# Patient Record
Sex: FEMALE | Race: WHITE | NOT HISPANIC OR LATINO | Employment: PART TIME | URBAN - METROPOLITAN AREA
[De-identification: names, ages, dates, MRNs, and addresses within clinical notes are randomized per-mention and may not be internally consistent; named-entity substitution may affect disease eponyms.]

---

## 2017-06-14 ENCOUNTER — OFFICE VISIT (OUTPATIENT)
Dept: MEDICAL GROUP | Facility: MEDICAL CENTER | Age: 32
End: 2017-06-14
Attending: NURSE PRACTITIONER
Payer: MEDICAID

## 2017-06-14 ENCOUNTER — HOSPITAL ENCOUNTER (OUTPATIENT)
Facility: MEDICAL CENTER | Age: 32
End: 2017-06-14
Attending: NURSE PRACTITIONER
Payer: MEDICAID

## 2017-06-14 VITALS
OXYGEN SATURATION: 97 % | SYSTOLIC BLOOD PRESSURE: 136 MMHG | RESPIRATION RATE: 16 BRPM | WEIGHT: 188 LBS | HEART RATE: 84 BPM | DIASTOLIC BLOOD PRESSURE: 88 MMHG | TEMPERATURE: 99 F | BODY MASS INDEX: 33.31 KG/M2 | HEIGHT: 63 IN

## 2017-06-14 DIAGNOSIS — Z11.3 ROUTINE SCREENING FOR STI (SEXUALLY TRANSMITTED INFECTION): ICD-10-CM

## 2017-06-14 DIAGNOSIS — Z30.41 ENCOUNTER FOR SURVEILLANCE OF CONTRACEPTIVE PILLS: ICD-10-CM

## 2017-06-14 DIAGNOSIS — Z01.419 ROUTINE GYNECOLOGICAL EXAMINATION: ICD-10-CM

## 2017-06-14 DIAGNOSIS — E66.9 OBESITY (BMI 30-39.9): ICD-10-CM

## 2017-06-14 DIAGNOSIS — Z12.4 SCREENING FOR CERVICAL CANCER: ICD-10-CM

## 2017-06-14 DIAGNOSIS — Z11.59 SCREENING FOR VIRAL DISEASE: ICD-10-CM

## 2017-06-14 DIAGNOSIS — Z13.1 SCREENING FOR DIABETES MELLITUS (DM): ICD-10-CM

## 2017-06-14 DIAGNOSIS — Z13.220 NEED FOR LIPID SCREENING: ICD-10-CM

## 2017-06-14 PROCEDURE — 88175 CYTOPATH C/V AUTO FLUID REDO: CPT

## 2017-06-14 PROCEDURE — G0101 CA SCREEN;PELVIC/BREAST EXAM: HCPCS | Performed by: NURSE PRACTITIONER

## 2017-06-14 PROCEDURE — 87624 HPV HI-RISK TYP POOLED RSLT: CPT

## 2017-06-14 PROCEDURE — 99212 OFFICE O/P EST SF 10 MIN: CPT | Mod: 25 | Performed by: NURSE PRACTITIONER

## 2017-06-14 PROCEDURE — 87491 CHLMYD TRACH DNA AMP PROBE: CPT

## 2017-06-14 PROCEDURE — 87591 N.GONORRHOEAE DNA AMP PROB: CPT

## 2017-06-14 RX ORDER — NORGESTIMATE AND ETHINYL ESTRADIOL 7DAYSX3 28
1 KIT ORAL DAILY
Qty: 28 TAB | Refills: 11 | Status: SHIPPED | OUTPATIENT
Start: 2017-06-14 | End: 2018-05-11 | Stop reason: SDUPTHER

## 2017-06-14 RX ORDER — NORGESTIMATE AND ETHINYL ESTRADIOL 7DAYSX3 28
KIT ORAL
COMMUNITY
Start: 2017-05-20 | End: 2017-06-14 | Stop reason: SDUPTHER

## 2017-06-14 ASSESSMENT — PATIENT HEALTH QUESTIONNAIRE - PHQ9: CLINICAL INTERPRETATION OF PHQ2 SCORE: 0

## 2017-06-14 ASSESSMENT — PAIN SCALES - GENERAL: PAINLEVEL: NO PAIN

## 2017-06-14 NOTE — PROGRESS NOTES
SUBJECTIVE: 31 y.o. female for annual routine gynecologic exam  Chief Complaint   Patient presents with   • Gynecologic Exam       Pt in clinic today for a well woman's and annual exam. She has not been seen in the clinic since . She states she is going through a divorce but otherwise is well. Pt stating that she has been feeling well. No vaginal DC/odor, urinary symptoms. Denies bloating, pelvic pain. Pt is currently sexually active. She has had a new partner and would like STD testing. Last pap smear: 2014, normal.  She states her periods are regular and occur every 28 days. Last menstrual period: 17. She denies h/o abnormal pap smear. No h/o sexually transmitted infections. Current birth control/contraception method: OCPs-she states she needs a refill. Last mammogram: N/A. Pt does perform monthly self breast exams. +family history of breast cancer in both grandmothers, and maternal aunt in 40s. No breast tenderness, mass, nipple discharge, changes in size or contour. Last DEXA: N/A. Colon ca screening: N/A.       Obstetric History       T2      TAB1   SAB0   E0   M0   L2         She  reports that she has never smoked. She has never used smokeless tobacco.    LMP Date: 17      ROS:    No abdominal pain, change in bowel habits, black or bloody stools.    No unusual headaches, no visual changes  No prolonged cough. No dyspnea or chest pain on exertion.  No depression, labile mood, anxiety ,libido changes, insomnia.  No new/concerning skin lesions    Further ROS as documented above in my HPI    Preventive Care:      Current medications, allergies, and problem list reviewed with patient and updated in [facility].    Family History:   Family History   Problem Relation Age of Onset   • Hypertension Mother    • Hypertension Father    • Hypertension Maternal Grandmother    • Cancer Maternal Grandmother      breast   • Cancer Maternal Grandfather      prostate   • Cancer Paternal Grandmother       "breast   • Heart Disease Paternal Grandfather           OBJECTIVE:   /88 mmHg  Pulse 84  Temp(Src) 37.2 °C (99 °F)  Resp 16  Ht 1.6 m (5' 2.99\")  Wt 85.276 kg (188 lb)  BMI 33.31 kg/m2  SpO2 97%  LMP 05/16/2017  Breastfeeding? No  Body mass index is 33.31 kg/(m^2).    Head and neck:  Neck supple. No adenopathy or masses in the neck or supraclavicular regions.  No carotid bruits. No thyromegaly.   Neuro: Alert and oriented.  Chest:  Clear, good air entry, no wheezes or rales.   Heart:  Regular rate and rhythm.  S1 and S2 normal.  No edema or JVD.   Abdomen:  Soft without tenderness, guarding, mass or organomegaly.  No CVA tenderness or inguinal adenopathy.   Extremities:  No edema.  Skin: color normal, temperature normal   No rashes or suspicious skin lesions noted.  Breast Exam: Breast exam completed. Symmetrical. No dimpling. No nodules palpated.  No axillary/supraclavicular lymphadenopathy  Pelvic Exam -  Normal external genitalia with no lesions. Normal vaginal mucosa with normal rugation and scant discharge. Cervix with no visible lesions. No cervical motion tenderness. Uterus is normal sized with no masses. No adnexal tenderness or enlargement appreciated. specimen obtained and sent to lab      Assessment and Plan    1. Routine gynecological examination     2. Encounter for surveillance of contraceptive pills  TRI-SPRINTEC 0.18/0.215/0.25 MG-35 MCG Tab    She was educated that OCPs do not protect against STIs and she needs to continue to use condoms with sexual activity.  She was told that some medications can alter the effectiveness of OCPs and she is to discuss this with a pharmacist if she is prescribed a medication/antibiotic.  She was educated that abnormal bleeding or spotting is common in the first couple of months on birth control.  She was educated in risk and s/s of thromboembolism and understands that she is to seek emergency care if these occur.     3. Screening for cervical cancer  " THINPREP PAP W/HPV AND CTNG   4. Routine screening for STI (sexually transmitted infection)  GC testing added to Pap smear.   5. Screening for viral disease  HIV ANTIBODIES    HEPATITIS PANEL ACUTE(4 COMPONENTS)   6. Screening for diabetes mellitus (DM)  COMP METABOLIC PANEL   7. Need for lipid screening  LIPID PROFILE   8. Obesity (BMI 30-39.9)  CBC WITHOUT DIFFERENTIAL    COMP METABOLIC PANEL    LIPID PROFILE    Patient identified as having weight management issue.  Appropriate orders and counseling given.       Follow-up: routine FU as scheduled.     Will call with results of labs.  Current screening recommendations reviewed with patient

## 2017-06-14 NOTE — MR AVS SNAPSHOT
"        Valerie Ruggiero   2017 3:50 PM   Office Visit   MRN: [id number]    [facility]   Dept Phone:  [phone]    Description:  Female : 1985   Provider:  DENISE Godoy           Reason for Visit     Gynecologic Exam           Allergies as of 2017     Allergen Noted Reactions    Amoxicillin 2013   Nausea      You were diagnosed with     Routine gynecological examination   [V72.31.ICD-9-CM]       Encounter for surveillance of contraceptive pills   [468493]       Screening for cervical cancer   [425521]       Routine screening for STI (sexually transmitted infection)   [887254]       Screening for diabetes mellitus (DM)   [249700]       Need for lipid screening   [273092]       Screening for viral disease   [218299]       Obesity (BMI 30-39.9)   [953717]         Vital Signs     Blood Pressure Pulse Temperature Respirations Height Weight    136/88 mmHg 84 37.2 °C (99 °F) 16 1.6 m (5' 2.99\") 85.276 kg (188 lb)    Body Mass Index Oxygen Saturation Last Menstrual Period Breastfeeding? Smoking Status       33.31 kg/m2 97% 2017 No Never Smoker        Basic Information     Date Of Birth Sex Race Ethnicity Preferred Language    1985 Female White Non- English      Health Maintenance        Date Due Completion Dates    PAP SMEAR 2017, 2013, 2013    IMM DTaP/Tdap/Td Vaccine (2 - Td) 2023            Current Immunizations     MMR Vaccine 2013  5:15 AM    MMR/Varicella Combined Vaccine  Incomplete    Tdap Vaccine 2013  2:45 AM,  Incomplete      Below and/or attached are the medications your provider expects you to take. Review all of your home medications and newly ordered medications with your provider and/or pharmacist. Follow medication instructions as directed by your provider and/or pharmacist. Please keep your medication list with you and share with your provider. Update the information when " medications are discontinued, doses are changed, or new medications (including over-the-counter products) are added; and carry medication information at all times in the event of emergency situations     Allergies:  AMOXICILLIN - Nausea               Medications  Valid as of: June 14, 2017 -  4:31 PM    Generic Name Brand Name Tablet Size Instructions for use    Norgestim-Eth Estrad Triphasic (Tab) TRI-SPRINTEC 0.18/0.215/0.25 MG-35 MCG Take 1 Tab by mouth every day.        .                 Medicines prescribed today were sent to:     [facility] [address] [place] [address] - [address] [id number] [facility] [address]    Phone: [phone] Fax: [phone]    Open 24 Hours?: No      Medication refill instructions:       If your prescription bottle indicates you have medication refills left, it is not necessary to call your provider’s office. Please contact your pharmacy and they will refill your medication.    If your prescription bottle indicates you do not have any refills left, you may request refills at any time through one of the following ways: The online [employer] system (except Urgent Care), by calling your provider’s office, or by asking your pharmacy to contact your provider’s office with a refill request. Medication refills are processed only during regular business hours and may not be available until the next business day. Your provider may request additional information or to have a follow-up visit with you prior to refilling your medication.   *Please Note: Medication refills are assigned a new Rx number when refilled electronically. Your pharmacy may indicate that no refills were authorized even though a new prescription for the same medication is available at the pharmacy. Please request the medicine by name with the pharmacy before contacting your provider for a refill.        Your To Do List     Future Labs/Procedures Complete By Expires    CBC WITHOUT DIFFERENTIAL  As directed  12/14/2017    COMP METABOLIC PANEL  As directed 6/14/2018    HEPATITIS PANEL ACUTE(4 COMPONENTS)  As directed 6/14/2018    HIV ANTIBODIES  As directed 6/14/2018    LIPID PROFILE  As directed 6/14/2018    THINPREP PAP W/HPV AND CTNG  As directed 6/14/2018         [employer] Access Code: [email or web address]  Expires: 7/13/2017 11:46 AM    [employer]  A secure, online tool to manage your health information     [employer]’s [employer]® is a secure, online tool that connects you to your personalized health information from the privacy of your home -- day or night - making it very easy for you to manage your healthcare. Once the activation process is completed, you can even access your medical information using the [employer] jojo, which is available for free in the Apple Jojo store or Google Play store.     [employer] provides the following levels of access (as shown below):   My Chart Features   [facility] Primary Care Doctor [facility]  Specialists [facility]  Urgent  Care Non-[facility]  Primary Care  Doctor   Email your healthcare team securely and privately 24/7 X X X    Manage appointments: schedule your next appointment; view details of past/upcoming appointments X      Request prescription refills. X      View recent personal medical records, including lab and immunizations X X X X   View health record, including health history, allergies, medications X X X X   Read reports about your outpatient visits, procedures, consult and ER notes X X X X   See your discharge summary, which is a recap of your hospital and/or ER visit that includes your diagnosis, lab results, and care plan. X X       How to register for [employer]:  1. Go to  [email or web address].  2. Click on the Sign Up Now box, which takes you to the New Member Sign Up page. You will need to provide the following information:  a. Enter your [employer] Access Code exactly as it appears at the top of this page. (You will not need to use this code after you’ve completed the sign-up  process. If you do not sign up before the expiration date, you must request a new code.)   b. Enter your date of birth.   c. Enter your home email address.   d. Click Submit, and follow the next screen’s instructions.  3. Create a [employer] ID. This will be your [employer] login ID and cannot be changed, so think of one that is secure and easy to remember.  4. Create a [employer] password. You can change your password at any time.  5. Enter your Password Reset Question and Answer. This can be used at a later time if you forget your password.   6. Enter your e-mail address. This allows you to receive e-mail notifications when new information is available in [employer].  7. Click Sign Up. You can now view your health information.    For assistance activating your [employer] account, call [phone]

## 2017-06-15 LAB
C TRACH DNA GENITAL QL NAA+PROBE: NEGATIVE
CYTOLOGY REG CYTOL: NORMAL
HPV HR 12 DNA CVX QL NAA+PROBE: NEGATIVE
HPV16 DNA SPEC QL NAA+PROBE: NEGATIVE
HPV18 DNA SPEC QL NAA+PROBE: NEGATIVE
N GONORRHOEA DNA GENITAL QL NAA+PROBE: NEGATIVE
SPECIMEN SOURCE: NORMAL
SPECIMEN SOURCE: NORMAL

## 2018-03-18 ENCOUNTER — RESOLUTE PROFESSIONAL BILLING HOSPITAL PROF FEE (OUTPATIENT)
Dept: HOSPITALIST | Facility: MEDICAL CENTER | Age: 33
End: 2018-03-18
Payer: MEDICAID

## 2018-03-18 ENCOUNTER — HOSPITAL ENCOUNTER (INPATIENT)
Facility: MEDICAL CENTER | Age: 33
LOS: 2 days | DRG: 854 | End: 2018-03-20
Attending: EMERGENCY MEDICINE | Admitting: HOSPITALIST
Payer: MEDICAID

## 2018-03-18 ENCOUNTER — APPOINTMENT (OUTPATIENT)
Dept: RADIOLOGY | Facility: MEDICAL CENTER | Age: 33
DRG: 854 | End: 2018-03-18
Attending: DENTIST
Payer: MEDICAID

## 2018-03-18 DIAGNOSIS — K04.7 DENTAL ABSCESS: ICD-10-CM

## 2018-03-18 LAB
BASOPHILS # BLD AUTO: 0.1 % (ref 0–1.8)
BASOPHILS # BLD: 0.02 K/UL (ref 0–0.12)
EOSINOPHIL # BLD AUTO: 0.06 K/UL (ref 0–0.51)
EOSINOPHIL NFR BLD: 0.4 % (ref 0–6.9)
ERYTHROCYTE [DISTWIDTH] IN BLOOD BY AUTOMATED COUNT: 38.2 FL (ref 35.9–50)
HCT VFR BLD AUTO: 42 % (ref 37–47)
HGB BLD-MCNC: 15 G/DL (ref 12–16)
IMM GRANULOCYTES # BLD AUTO: 0.09 K/UL (ref 0–0.11)
IMM GRANULOCYTES NFR BLD AUTO: 0.6 % (ref 0–0.9)
LACTATE BLD-SCNC: 1.4 MMOL/L (ref 0.5–2)
LYMPHOCYTES # BLD AUTO: 2.14 K/UL (ref 1–4.8)
LYMPHOCYTES NFR BLD: 13.5 % (ref 22–41)
MCH RBC QN AUTO: 30.1 PG (ref 27–33)
MCHC RBC AUTO-ENTMCNC: 35.7 G/DL (ref 33.6–35)
MCV RBC AUTO: 84.3 FL (ref 81.4–97.8)
MONOCYTES # BLD AUTO: 0.79 K/UL (ref 0–0.85)
MONOCYTES NFR BLD AUTO: 5 % (ref 0–13.4)
NEUTROPHILS # BLD AUTO: 12.8 K/UL (ref 2–7.15)
NEUTROPHILS NFR BLD: 80.4 % (ref 44–72)
NRBC # BLD AUTO: 0 K/UL
NRBC BLD-RTO: 0 /100 WBC
PLATELET # BLD AUTO: 219 K/UL (ref 164–446)
PMV BLD AUTO: 10.5 FL (ref 9–12.9)
RBC # BLD AUTO: 4.98 M/UL (ref 4.2–5.4)
WBC # BLD AUTO: 15.9 K/UL (ref 4.8–10.8)

## 2018-03-18 PROCEDURE — 85025 COMPLETE CBC W/AUTO DIFF WBC: CPT | Mod: EDC

## 2018-03-18 PROCEDURE — 99223 1ST HOSP IP/OBS HIGH 75: CPT | Performed by: HOSPITALIST

## 2018-03-18 PROCEDURE — 99285 EMERGENCY DEPT VISIT HI MDM: CPT | Mod: EDC

## 2018-03-18 PROCEDURE — 770006 HCHG ROOM/CARE - MED/SURG/GYN SEMI*: Mod: EDC

## 2018-03-18 PROCEDURE — 83605 ASSAY OF LACTIC ACID: CPT | Mod: EDC

## 2018-03-18 PROCEDURE — 70355 PANORAMIC X-RAY OF JAWS: CPT

## 2018-03-18 PROCEDURE — 700102 HCHG RX REV CODE 250 W/ 637 OVERRIDE(OP): Mod: EDC | Performed by: EMERGENCY MEDICINE

## 2018-03-18 PROCEDURE — 36415 COLL VENOUS BLD VENIPUNCTURE: CPT | Mod: EDC

## 2018-03-18 PROCEDURE — 80048 BASIC METABOLIC PNL TOTAL CA: CPT | Mod: EDC

## 2018-03-18 PROCEDURE — A9270 NON-COVERED ITEM OR SERVICE: HCPCS | Mod: EDC | Performed by: EMERGENCY MEDICINE

## 2018-03-18 RX ORDER — OXYCODONE HYDROCHLORIDE 5 MG/1
5 TABLET ORAL
Status: DISCONTINUED | OUTPATIENT
Start: 2018-03-18 | End: 2018-03-19

## 2018-03-18 RX ORDER — PROMETHAZINE HYDROCHLORIDE 25 MG/1
12.5-25 TABLET ORAL EVERY 4 HOURS PRN
Status: DISCONTINUED | OUTPATIENT
Start: 2018-03-18 | End: 2018-03-20 | Stop reason: HOSPADM

## 2018-03-18 RX ORDER — CLINDAMYCIN PHOSPHATE 600 MG/50ML
600 INJECTION, SOLUTION INTRAVENOUS EVERY 8 HOURS
Status: DISCONTINUED | OUTPATIENT
Start: 2018-03-19 | End: 2018-03-20

## 2018-03-18 RX ORDER — BISACODYL 10 MG
10 SUPPOSITORY, RECTAL RECTAL
Status: DISCONTINUED | OUTPATIENT
Start: 2018-03-18 | End: 2018-03-20 | Stop reason: HOSPADM

## 2018-03-18 RX ORDER — AMOXICILLIN 250 MG
2 CAPSULE ORAL 2 TIMES DAILY
Status: DISCONTINUED | OUTPATIENT
Start: 2018-03-19 | End: 2018-03-20 | Stop reason: HOSPADM

## 2018-03-18 RX ORDER — SODIUM CHLORIDE 9 MG/ML
INJECTION, SOLUTION INTRAVENOUS CONTINUOUS
Status: DISCONTINUED | OUTPATIENT
Start: 2018-03-19 | End: 2018-03-20 | Stop reason: HOSPADM

## 2018-03-18 RX ORDER — CLINDAMYCIN PHOSPHATE 600 MG/50ML
INJECTION, SOLUTION INTRAVENOUS
Status: COMPLETED
Start: 2018-03-18 | End: 2018-03-19

## 2018-03-18 RX ORDER — SODIUM CHLORIDE 9 MG/ML
30 INJECTION, SOLUTION INTRAVENOUS
Status: COMPLETED | OUTPATIENT
Start: 2018-03-18 | End: 2018-03-19

## 2018-03-18 RX ORDER — HYDROCODONE BITARTRATE AND ACETAMINOPHEN 5; 325 MG/1; MG/1
1 TABLET ORAL ONCE
Status: COMPLETED | OUTPATIENT
Start: 2018-03-18 | End: 2018-03-18

## 2018-03-18 RX ORDER — POLYETHYLENE GLYCOL 3350 17 G/17G
1 POWDER, FOR SOLUTION ORAL
Status: DISCONTINUED | OUTPATIENT
Start: 2018-03-18 | End: 2018-03-20 | Stop reason: HOSPADM

## 2018-03-18 RX ORDER — ONDANSETRON 2 MG/ML
4 INJECTION INTRAMUSCULAR; INTRAVENOUS EVERY 4 HOURS PRN
Status: DISCONTINUED | OUTPATIENT
Start: 2018-03-18 | End: 2018-03-20 | Stop reason: HOSPADM

## 2018-03-18 RX ORDER — CLINDAMYCIN PHOSPHATE 600 MG/50ML
600 INJECTION, SOLUTION INTRAVENOUS ONCE
Status: COMPLETED | OUTPATIENT
Start: 2018-03-18 | End: 2018-03-19

## 2018-03-18 RX ORDER — OXYCODONE HYDROCHLORIDE 5 MG/1
2.5 TABLET ORAL
Status: DISCONTINUED | OUTPATIENT
Start: 2018-03-18 | End: 2018-03-19

## 2018-03-18 RX ORDER — ACETAMINOPHEN 325 MG/1
650 TABLET ORAL EVERY 6 HOURS PRN
Status: DISCONTINUED | OUTPATIENT
Start: 2018-03-18 | End: 2018-03-20 | Stop reason: HOSPADM

## 2018-03-18 RX ORDER — LORAZEPAM 1 MG/1
0.5 TABLET ORAL EVERY 6 HOURS PRN
Status: DISCONTINUED | OUTPATIENT
Start: 2018-03-18 | End: 2018-03-20 | Stop reason: HOSPADM

## 2018-03-18 RX ORDER — PROMETHAZINE HYDROCHLORIDE 12.5 MG/1
12.5-25 SUPPOSITORY RECTAL EVERY 4 HOURS PRN
Status: DISCONTINUED | OUTPATIENT
Start: 2018-03-18 | End: 2018-03-20 | Stop reason: HOSPADM

## 2018-03-18 RX ORDER — SODIUM CHLORIDE 9 MG/ML
500 INJECTION, SOLUTION INTRAVENOUS
Status: COMPLETED | OUTPATIENT
Start: 2018-03-18 | End: 2018-03-19

## 2018-03-18 RX ORDER — HEPARIN SODIUM 5000 [USP'U]/ML
5000 INJECTION, SOLUTION INTRAVENOUS; SUBCUTANEOUS EVERY 8 HOURS
Status: DISCONTINUED | OUTPATIENT
Start: 2018-03-19 | End: 2018-03-20 | Stop reason: HOSPADM

## 2018-03-18 RX ORDER — LORAZEPAM 2 MG/ML
0.5 INJECTION INTRAMUSCULAR EVERY 6 HOURS PRN
Status: DISCONTINUED | OUTPATIENT
Start: 2018-03-18 | End: 2018-03-20 | Stop reason: HOSPADM

## 2018-03-18 RX ORDER — ONDANSETRON 4 MG/1
4 TABLET, ORALLY DISINTEGRATING ORAL EVERY 4 HOURS PRN
Status: DISCONTINUED | OUTPATIENT
Start: 2018-03-18 | End: 2018-03-20 | Stop reason: HOSPADM

## 2018-03-18 RX ORDER — MORPHINE SULFATE 4 MG/ML
2 INJECTION, SOLUTION INTRAMUSCULAR; INTRAVENOUS
Status: DISCONTINUED | OUTPATIENT
Start: 2018-03-18 | End: 2018-03-20 | Stop reason: HOSPADM

## 2018-03-18 RX ADMIN — HYDROCODONE BITARTRATE AND ACETAMINOPHEN 1 TABLET: 5; 325 TABLET ORAL at 23:07

## 2018-03-18 ASSESSMENT — PAIN SCALES - GENERAL: PAINLEVEL_OUTOF10: 7

## 2018-03-19 PROBLEM — K04.7 DENTAL ABSCESS: Status: ACTIVE | Noted: 2018-03-19

## 2018-03-19 PROBLEM — A41.9 SEPSIS (HCC): Status: ACTIVE | Noted: 2018-03-19

## 2018-03-19 PROBLEM — E87.6 HYPOKALEMIA: Status: ACTIVE | Noted: 2018-03-19

## 2018-03-19 LAB
ANION GAP SERPL CALC-SCNC: 11 MMOL/L (ref 0–11.9)
ANION GAP SERPL CALC-SCNC: 13 MMOL/L (ref 0–11.9)
BASOPHILS # BLD AUTO: 0.3 % (ref 0–1.8)
BASOPHILS # BLD: 0.03 K/UL (ref 0–0.12)
BUN SERPL-MCNC: 7 MG/DL (ref 8–22)
BUN SERPL-MCNC: 8 MG/DL (ref 8–22)
CALCIUM SERPL-MCNC: 8.3 MG/DL (ref 8.5–10.5)
CALCIUM SERPL-MCNC: 9.8 MG/DL (ref 8.5–10.5)
CHLORIDE SERPL-SCNC: 106 MMOL/L (ref 96–112)
CHLORIDE SERPL-SCNC: 107 MMOL/L (ref 96–112)
CO2 SERPL-SCNC: 17 MMOL/L (ref 20–33)
CO2 SERPL-SCNC: 18 MMOL/L (ref 20–33)
CREAT SERPL-MCNC: 0.68 MG/DL (ref 0.5–1.4)
CREAT SERPL-MCNC: 0.82 MG/DL (ref 0.5–1.4)
EOSINOPHIL # BLD AUTO: 0.05 K/UL (ref 0–0.51)
EOSINOPHIL NFR BLD: 0.4 % (ref 0–6.9)
ERYTHROCYTE [DISTWIDTH] IN BLOOD BY AUTOMATED COUNT: 41.3 FL (ref 35.9–50)
GLUCOSE SERPL-MCNC: 106 MG/DL (ref 65–99)
GLUCOSE SERPL-MCNC: 126 MG/DL (ref 65–99)
HCG UR QL: NEGATIVE
HCT VFR BLD AUTO: 38.4 % (ref 37–47)
HGB BLD-MCNC: 13 G/DL (ref 12–16)
IMM GRANULOCYTES # BLD AUTO: 0.05 K/UL (ref 0–0.11)
IMM GRANULOCYTES NFR BLD AUTO: 0.4 % (ref 0–0.9)
LYMPHOCYTES # BLD AUTO: 2.28 K/UL (ref 1–4.8)
LYMPHOCYTES NFR BLD: 20.3 % (ref 22–41)
MCH RBC QN AUTO: 30.2 PG (ref 27–33)
MCHC RBC AUTO-ENTMCNC: 33.9 G/DL (ref 33.6–35)
MCV RBC AUTO: 89.1 FL (ref 81.4–97.8)
MONOCYTES # BLD AUTO: 0.5 K/UL (ref 0–0.85)
MONOCYTES NFR BLD AUTO: 4.5 % (ref 0–13.4)
NEUTROPHILS # BLD AUTO: 8.32 K/UL (ref 2–7.15)
NEUTROPHILS NFR BLD: 74.1 % (ref 44–72)
NRBC # BLD AUTO: 0 K/UL
NRBC BLD-RTO: 0 /100 WBC
PLATELET # BLD AUTO: 198 K/UL (ref 164–446)
PMV BLD AUTO: 10.1 FL (ref 9–12.9)
POTASSIUM SERPL-SCNC: 3.3 MMOL/L (ref 3.6–5.5)
POTASSIUM SERPL-SCNC: 3.4 MMOL/L (ref 3.6–5.5)
RBC # BLD AUTO: 4.31 M/UL (ref 4.2–5.4)
SODIUM SERPL-SCNC: 136 MMOL/L (ref 135–145)
SODIUM SERPL-SCNC: 136 MMOL/L (ref 135–145)
SP GR UR REFRACTOMETRY: 1.01
WBC # BLD AUTO: 11.2 K/UL (ref 4.8–10.8)

## 2018-03-19 PROCEDURE — 0CDXXZ0 EXTRACTION OF LOWER TOOTH, SINGLE, EXTERNAL APPROACH: ICD-10-PCS | Performed by: DENTIST

## 2018-03-19 PROCEDURE — 99232 SBSQ HOSP IP/OBS MODERATE 35: CPT | Performed by: INTERNAL MEDICINE

## 2018-03-19 PROCEDURE — 700111 HCHG RX REV CODE 636 W/ 250 OVERRIDE (IP): Mod: EDC

## 2018-03-19 PROCEDURE — 0W930ZZ DRAINAGE OF ORAL CAVITY AND THROAT, OPEN APPROACH: ICD-10-PCS | Performed by: DENTIST

## 2018-03-19 PROCEDURE — 700111 HCHG RX REV CODE 636 W/ 250 OVERRIDE (IP): Mod: EDC | Performed by: HOSPITALIST

## 2018-03-19 PROCEDURE — 700105 HCHG RX REV CODE 258: Mod: EDC | Performed by: HOSPITALIST

## 2018-03-19 PROCEDURE — 700102 HCHG RX REV CODE 250 W/ 637 OVERRIDE(OP): Mod: EDC | Performed by: HOSPITALIST

## 2018-03-19 PROCEDURE — A9270 NON-COVERED ITEM OR SERVICE: HCPCS | Mod: EDC | Performed by: HOSPITALIST

## 2018-03-19 PROCEDURE — 80048 BASIC METABOLIC PNL TOTAL CA: CPT | Mod: EDC

## 2018-03-19 PROCEDURE — 700101 HCHG RX REV CODE 250: Mod: EDC

## 2018-03-19 PROCEDURE — 85025 COMPLETE CBC W/AUTO DIFF WBC: CPT | Mod: EDC

## 2018-03-19 PROCEDURE — 700101 HCHG RX REV CODE 250: Mod: EDC | Performed by: HOSPITALIST

## 2018-03-19 PROCEDURE — 700111 HCHG RX REV CODE 636 W/ 250 OVERRIDE (IP): Mod: EDC | Performed by: INTERNAL MEDICINE

## 2018-03-19 PROCEDURE — 36415 COLL VENOUS BLD VENIPUNCTURE: CPT | Mod: EDC

## 2018-03-19 PROCEDURE — 160036 HCHG PACU - EA ADDL 30 MINS PHASE I: Mod: EDC | Performed by: DENTIST

## 2018-03-19 PROCEDURE — A9270 NON-COVERED ITEM OR SERVICE: HCPCS | Mod: EDC

## 2018-03-19 PROCEDURE — 160048 HCHG OR STATISTICAL LEVEL 1-5: Mod: EDC | Performed by: DENTIST

## 2018-03-19 PROCEDURE — 160002 HCHG RECOVERY MINUTES (STAT): Mod: EDC | Performed by: DENTIST

## 2018-03-19 PROCEDURE — 770006 HCHG ROOM/CARE - MED/SURG/GYN SEMI*: Mod: EDC

## 2018-03-19 PROCEDURE — 160027 HCHG SURGERY MINUTES - 1ST 30 MINS LEVEL 2: Mod: EDC | Performed by: DENTIST

## 2018-03-19 PROCEDURE — 81025 URINE PREGNANCY TEST: CPT | Mod: EDC

## 2018-03-19 PROCEDURE — 96365 THER/PROPH/DIAG IV INF INIT: CPT | Mod: EDC

## 2018-03-19 PROCEDURE — A9270 NON-COVERED ITEM OR SERVICE: HCPCS | Mod: EDC | Performed by: DENTIST

## 2018-03-19 PROCEDURE — 700102 HCHG RX REV CODE 250 W/ 637 OVERRIDE(OP): Mod: EDC | Performed by: DENTIST

## 2018-03-19 PROCEDURE — 160009 HCHG ANES TIME/MIN: Mod: EDC | Performed by: DENTIST

## 2018-03-19 PROCEDURE — 501838 HCHG SUTURE GENERAL: Mod: EDC | Performed by: DENTIST

## 2018-03-19 PROCEDURE — 700102 HCHG RX REV CODE 250 W/ 637 OVERRIDE(OP): Mod: EDC

## 2018-03-19 PROCEDURE — 87040 BLOOD CULTURE FOR BACTERIA: CPT | Mod: 91,EDC

## 2018-03-19 PROCEDURE — 160035 HCHG PACU - 1ST 60 MINS PHASE I: Mod: EDC | Performed by: DENTIST

## 2018-03-19 RX ORDER — HYDRALAZINE HYDROCHLORIDE 20 MG/ML
INJECTION INTRAMUSCULAR; INTRAVENOUS
Status: COMPLETED
Start: 2018-03-19 | End: 2018-03-19

## 2018-03-19 RX ORDER — DIPHENHYDRAMINE HYDROCHLORIDE 50 MG/ML
INJECTION INTRAMUSCULAR; INTRAVENOUS
Status: COMPLETED
Start: 2018-03-19 | End: 2018-03-19

## 2018-03-19 RX ORDER — MAGNESIUM HYDROXIDE 1200 MG/15ML
LIQUID ORAL
Status: DISCONTINUED | OUTPATIENT
Start: 2018-03-19 | End: 2018-03-19 | Stop reason: HOSPADM

## 2018-03-19 RX ORDER — HYDRALAZINE HYDROCHLORIDE 20 MG/ML
10 INJECTION INTRAMUSCULAR; INTRAVENOUS EVERY 6 HOURS PRN
Status: DISCONTINUED | OUTPATIENT
Start: 2018-03-19 | End: 2018-03-20 | Stop reason: HOSPADM

## 2018-03-19 RX ORDER — CHLORHEXIDINE GLUCONATE ORAL RINSE 1.2 MG/ML
15 SOLUTION DENTAL 2 TIMES DAILY
Status: DISCONTINUED | OUTPATIENT
Start: 2018-03-19 | End: 2018-03-20 | Stop reason: HOSPADM

## 2018-03-19 RX ORDER — LABETALOL HYDROCHLORIDE 5 MG/ML
INJECTION, SOLUTION INTRAVENOUS
Status: COMPLETED
Start: 2018-03-19 | End: 2018-03-19

## 2018-03-19 RX ADMIN — FENTANYL CITRATE 50 MCG: 50 INJECTION, SOLUTION INTRAMUSCULAR; INTRAVENOUS at 19:20

## 2018-03-19 RX ADMIN — SODIUM CHLORIDE 2448 ML: 9 INJECTION, SOLUTION INTRAVENOUS at 00:07

## 2018-03-19 RX ADMIN — HYDRALAZINE HYDROCHLORIDE 10 MG: 20 INJECTION INTRAMUSCULAR; INTRAVENOUS at 05:39

## 2018-03-19 RX ADMIN — LABETALOL HYDROCHLORIDE 10 MG: 5 INJECTION, SOLUTION INTRAVENOUS at 21:30

## 2018-03-19 RX ADMIN — HYDRALAZINE HYDROCHLORIDE 10 MG: 20 INJECTION INTRAMUSCULAR; INTRAVENOUS at 06:43

## 2018-03-19 RX ADMIN — SODIUM CHLORIDE: 9 INJECTION, SOLUTION INTRAVENOUS at 05:38

## 2018-03-19 RX ADMIN — CLINDAMYCIN IN 5 PERCENT DEXTROSE 600 MG: 12 INJECTION, SOLUTION INTRAVENOUS at 14:12

## 2018-03-19 RX ADMIN — HEPARIN SODIUM 5000 UNITS: 5000 INJECTION, SOLUTION INTRAVENOUS; SUBCUTANEOUS at 22:37

## 2018-03-19 RX ADMIN — CLINDAMYCIN IN 5 PERCENT DEXTROSE 600 MG: 12 INJECTION, SOLUTION INTRAVENOUS at 05:39

## 2018-03-19 RX ADMIN — OXYCODONE HYDROCHLORIDE 5 MG: 5 TABLET ORAL at 10:58

## 2018-03-19 RX ADMIN — DIPHENHYDRAMINE HYDROCHLORIDE 12.5 MG: 50 INJECTION INTRAMUSCULAR; INTRAVENOUS at 20:40

## 2018-03-19 RX ADMIN — SODIUM CHLORIDE: 9 INJECTION, SOLUTION INTRAVENOUS at 22:43

## 2018-03-19 RX ADMIN — STANDARDIZED SENNA CONCENTRATE AND DOCUSATE SODIUM 2 TABLET: 8.6; 5 TABLET, FILM COATED ORAL at 22:38

## 2018-03-19 RX ADMIN — CLINDAMYCIN PHOSPHATE 600 MG: 600 INJECTION, SOLUTION INTRAVENOUS at 00:07

## 2018-03-19 RX ADMIN — MORPHINE SULFATE 2 MG: 4 INJECTION INTRAVENOUS at 09:33

## 2018-03-19 RX ADMIN — MORPHINE SULFATE 2 MG: 4 INJECTION INTRAVENOUS at 02:22

## 2018-03-19 RX ADMIN — SODIUM CHLORIDE 500 ML: 9 INJECTION, SOLUTION INTRAVENOUS at 07:45

## 2018-03-19 RX ADMIN — HYDRALAZINE HYDROCHLORIDE 10 MG: 20 INJECTION INTRAMUSCULAR; INTRAVENOUS at 20:00

## 2018-03-19 RX ADMIN — ONDANSETRON HYDROCHLORIDE 4 MG: 2 INJECTION, SOLUTION INTRAMUSCULAR; INTRAVENOUS at 10:57

## 2018-03-19 RX ADMIN — CLINDAMYCIN IN 5 PERCENT DEXTROSE 600 MG: 12 INJECTION, SOLUTION INTRAVENOUS at 22:44

## 2018-03-19 RX ADMIN — HYDROCODONE BITARTRATE AND ACETAMINOPHEN 15 ML: 2.5; 108 SOLUTION ORAL at 19:30

## 2018-03-19 RX ADMIN — FENTANYL CITRATE 50 MCG: 50 INJECTION, SOLUTION INTRAMUSCULAR; INTRAVENOUS at 19:30

## 2018-03-19 RX ADMIN — CHLORHEXIDINE GLUCONATE 15 ML: 1.2 RINSE ORAL at 22:37

## 2018-03-19 RX ADMIN — CLINDAMYCIN IN 5 PERCENT DEXTROSE 600 MG: 12 INJECTION, SOLUTION INTRAVENOUS at 00:07

## 2018-03-19 RX ADMIN — HYDRALAZINE HYDROCHLORIDE 10 MG: 20 INJECTION INTRAMUSCULAR; INTRAVENOUS at 20:17

## 2018-03-19 ASSESSMENT — PAIN SCALES - GENERAL
PAINLEVEL_OUTOF10: 5
PAINLEVEL_OUTOF10: 3
PAINLEVEL_OUTOF10: 8
PAINLEVEL_OUTOF10: 4
PAINLEVEL_OUTOF10: 6
PAINLEVEL_OUTOF10: 2
PAINLEVEL_OUTOF10: 6
PAINLEVEL_OUTOF10: 0
PAINLEVEL_OUTOF10: 8
PAINLEVEL_OUTOF10: 0
PAINLEVEL_OUTOF10: 4
PAINLEVEL_OUTOF10: 0
PAINLEVEL_OUTOF10: 3

## 2018-03-19 ASSESSMENT — ENCOUNTER SYMPTOMS
ABDOMINAL PAIN: 0
CHILLS: 0
BLURRED VISION: 0
MYALGIAS: 1
BACK PAIN: 0
COUGH: 0
NAUSEA: 0
FEVER: 0
VOMITING: 0
SHORTNESS OF BREATH: 0
DIZZINESS: 0
PALPITATIONS: 0

## 2018-03-19 ASSESSMENT — LIFESTYLE VARIABLES
HAVE PEOPLE ANNOYED YOU BY CRITICIZING YOUR DRINKING: NO
EVER FELT BAD OR GUILTY ABOUT YOUR DRINKING: NO
ALCOHOL_USE: YES
HOW MANY TIMES IN THE PAST YEAR HAVE YOU HAD 5 OR MORE DRINKS IN A DAY: 0
TOTAL SCORE: 0
TOTAL SCORE: 0
CONSUMPTION TOTAL: NEGATIVE
AVERAGE NUMBER OF DAYS PER WEEK YOU HAVE A DRINK CONTAINING ALCOHOL: 0
HAVE YOU EVER FELT YOU SHOULD CUT DOWN ON YOUR DRINKING: NO
ON A TYPICAL DAY WHEN YOU DRINK ALCOHOL HOW MANY DRINKS DO YOU HAVE: 2
TOTAL SCORE: 0
EVER HAD A DRINK FIRST THING IN THE MORNING TO STEADY YOUR NERVES TO GET RID OF A HANGOVER: NO
EVER_SMOKED: NEVER

## 2018-03-19 NOTE — PROGRESS NOTES
Pt arrived to floor around 0200. Ambulated from [facility] to bed  A&ox 4. Left facial swelling. Using ice pack  C/o pain. Morphine 2mg given  NPO after MN except for sips  Surgery in AM  Bolus continued to floor. bp remains elevated  O2 on RA.   Oriented to floor. Call light within reach. Hourly rounding in place  2 rn skin assessment done. No skin breakdown noted.

## 2018-03-19 NOTE — ED TRIAGE NOTES
Valerie Ruggiero  32 y.o.  female  Chief Complaint   Patient presents with   • Oral Swelling     left lower      Present to triage c/o left lower jaw swelling ( possible dental abscess ) 7 /10.

## 2018-03-19 NOTE — CARE PLAN
Problem: Safety  Goal: Will remain free from injury  Updated about POC. Reinforce call light use. Pt acknowledged understanding    Problem: Infection  Goal: Will remain free from infection  Remains afebrile. Denies chills. Blood cx done. Bolus continued

## 2018-03-19 NOTE — H&P
DATE OF ADMISSION:  2018    CHIEF COMPLAINT:  Facial swelling and pain.    HISTORY OF PRESENT ILLNESS:  Patient is a 32-year-old female that has no   significant past medical history.  She presented to the emergency department   complaining of left-sided facial swelling that began earlier in the morning   and has been progressively worsening throughout the day.  She reports that she   has had toothache for the past 2 days.  She has a chipped tooth, apparently   chipped in half, not sure when.  She thinks that she has a cavity there.    Nonetheless, this morning she noticed some swelling that has progressively   worsened throughout the day, now she is having severe pain which radiates into   her neck and into her temple region.  She has no fevers, chills and no other   complaints.  She also complains of having difficulty opening her mouth fully.    REVIEW OF SYSTEMS:  As per HPI.  All other systems have been reviewed and are   negative.    PAST MEDICAL HISTORY:  None.    PAST SURGICAL HISTORY:  .    SOCIAL HISTORY:  She denies tobacco or drug use.  She drinks alcohol   occasionally.    ALLERGIES:  AMOXICILLIN AND PERCOCET.    HOME MEDICATIONS:  None.    FAMILY HISTORY:  Has been reviewed and is not pertinent to her current   presentation.    PHYSICAL EXAMINATION:  VITAL SIGNS:  Blood pressure 159/98, pulse is 92, respirations 17, temperature   is 38.2, oxygen saturation 96% on room air, and weight 81.6 kilograms.  GENERAL:  Patient appears uncomfortable, currently in mild distress secondary   to pain.  HEENT:  Dry mucous membranes.  She is unable to open her mouth fully.  She has   significant edema to the left side of her face with edema.  It is tender to   palpation.  She has dental fracture of the left posterior molar.  Eyes, EOMI,   PERRLA.  NECK:  No lymphadenopathy, no JVD.  CARDIOVASCULAR:  Tachycardic, regular rhythm, no murmurs.  LUNGS:  Clear to auscultation bilaterally.  No rales or  rhonchi.  ABDOMEN:  Positive bowel sounds, soft, nontender, nondistended.  EXTREMITIES:  No clubbing, cyanosis, or edema.  NEUROLOGIC:  Awake, alert, and oriented to person, place, time, and situation.    LABORATORY DATA:  WBC 15.9 and hematocrit 42, hemoglobin is 15, platelets 219.    Sodium 136, potassium 3.3, BUN 8, creatinine 0.82, anion gap of 13,   bicarbonate 17, lactic acid is 1.4.    IMAGING:  Mandible panoramic shows possible left mandibular premolar tooth   abscess.    ASSESSMENT AND PLAN:  Patient is a 32-year-old female who comes in to the   hospital with left-sided facial swelling.  1.  Sepsis.  The patient fits sepsis criteria with leukocytosis and   tachycardia.  I have initiated sepsis protocol.  We will treat her with IV   fluids and IV clindamycin.  We will obtain cultures.  It appears that she has   an abscess.  Oral surgery has been consulted.  The patient will be taken to   the operating room in the morning by Dr. Garcia.  She will be kept n.p.o. We   will treat her with oral and IV pain medications for pain control.  2.  Oral abscess.  3.  Hypokalemia, we will replenish this with IV fluids.  4.  Deep venous thrombosis prophylaxis, heparin 5000 units t.i.d.  5.  She is full code.    I expect the patient to be hospitalized for at least 2 midnights to treat the   above-mentioned medical conditions.       ____________________________________     MD BRIANA London / MARITZA    DD:  03/19/2018 02:52:21  DT:  03/19/2018 04:13:07    D#:  [id number]  Job#:  [id number]

## 2018-03-19 NOTE — PROGRESS NOTES
[facility] Hospitalist Progress Note    Date of Service: 3/19/2018    Chief Complaint  32 y.o. female admitted 3/18/2018 with dental abscess    Interval Problem Update  Pt seen and examined, afebrile, plan is for I&D of her dental abscess today.     Consultants/Specialty  OMFS: Dr. Dodson    Disposition  Home when medically cleared         Review of Systems   Constitutional: Negative for chills and fever.   HENT: Negative for hearing loss.    Eyes: Negative for blurred vision.   Respiratory: Negative for cough and shortness of breath.    Cardiovascular: Negative for chest pain and palpitations.   Gastrointestinal: Negative for abdominal pain, nausea and vomiting.   Musculoskeletal: Positive for myalgias. Negative for back pain.   Neurological: Negative for dizziness.      Physical Exam  Laboratory/Imaging   Hemodynamics  Temp (24hrs), Av.4 °C (99.4 °F), Min:36.8 °C (98.2 °F), Max:38.2 °C (100.7 °F)   Temperature: 37.1 °C (98.8 °F)  Pulse  Av  Min: 73  Max: 107 Heart Rate (Monitored): 89  Blood Pressure: 142/87, NIBP: (!) 164/99      Respiratory      Respiration: 18, Pulse Oximetry: 95 %             Fluids    Intake/Output Summary (Last 24 hours) at 18 1459  Last data filed at 18 1426   Gross per 24 hour   Intake             3050 ml   Output             2075 ml   Net              975 ml       Nutrition  Orders Placed This Encounter   Procedures   • Diet NPO     Standing Status:   Standing     Number of Occurrences:   1     Order Specific Question:   Restrict to:     Answer:   Sips with Medications [3]     Physical Exam   Constitutional: She is oriented to person, place, and time.   HENT:   Head: Normocephalic and atraumatic.   Eyes: Conjunctivae are normal. No scleral icterus.   Neck: Neck supple. No JVD present.   Cardiovascular: Normal rate.    No murmur heard.  Abdominal: Soft. Bowel sounds are normal. She exhibits no distension. There is no tenderness.   Musculoskeletal: She exhibits no edema.    Neurological: She is alert and oriented to person, place, and time.   Skin: Skin is warm and dry. No erythema.   Nursing note and vitals reviewed.      Recent Labs      03/18/18 2330  03/19/18   0358   WBC  15.9*  11.2*   RBC  4.98  4.31   HEMOGLOBIN  15.0  13.0   HEMATOCRIT  42.0  38.4   MCV  84.3  89.1   MCH  30.1  30.2   MCHC  35.7*  33.9   RDW  38.2  41.3   PLATELETCT  219  198   MPV  10.5  10.1     Recent Labs      03/18/18   2330  03/19/18   0317   SODIUM  136  136   POTASSIUM  3.3*  3.4*   CHLORIDE  106  107   CO2  17*  18*   GLUCOSE  126*  106*   BUN  8  7*   CREATININE  0.82  0.68   CALCIUM  9.8  8.3*                      Assessment/Plan     * Sepsis (CMS-[facility])   Assessment & Plan    This is sepsis (without associated acute organ dysfunction).   Sepsis protocol  Follow-up cultures  IV clindamycin  OMFS to take to OR today for her dental abscess appreciate rec.         Dental abscess   Assessment & Plan    Dr. Garcia with oral surgery is to take to the operating room today   Follow-up culture results  IV antibiotics          Quality-Core Measures   Reviewed items::  Labs reviewed, Radiology images reviewed and Medications reviewed  Sosa catheter::  No Sosa  DVT prophylaxis pharmacological::  Heparin  Antibiotics:  Treating active infection/contamination beyond 24 hours perioperative coverage  Assessed for rehabilitation services:  Patient returned to prior level of function, rehabilitation not indicated at this time

## 2018-03-19 NOTE — ED NOTES
Fluid bolus rate decreased d/t blood pressures. Pt awake, alert and appropriate. Transport at bedside.

## 2018-03-19 NOTE — ED PROVIDER NOTES
"ED Provider Note    Scribed for Chelly Hollingsworth M.D. by Jb Ge. 3/18/2018  9:56 PM    Means of arrival: Walk in  History obtained from: Patient  History limited by: None       CHIEF COMPLAINT  Chief Complaint   Patient presents with   • Oral Swelling     left lower        HPI  Valerie Ruggiero is a 32 y.o. Otherwise healthy female who presents to the Emergency Department for evaluation of left lower oral swelling onset this morning. The patient claims she has had a toothache for the past 2 nights that has progressively increased in severity. She endorses sharp pain to the left side of her face which radiates to her temple and into her neck.  She denies associated fever, vomiting, diarrhea, chest pain, or shortness of breath. She is attempted ibuprofen and Tylenol at home without significant relief. She does endorse some relief from an ice pack to her face.      REVIEW OF SYSTEMS  Pertinent positive include left lower oral swelling and dental pain. Pertinent negative include fever, vomiting, diarrhea, chest pain, or shortness of breath. All other systems reviewed and are negative.  C.       PAST MEDICAL HISTORY   has a past medical history of Irregular periods (11/25/2014).    SOCIAL HISTORY  Social History     Social History Main Topics   • Smoking status: Never Smoker   • Smokeless tobacco: Never Used   • Alcohol use Yes      Comment: ocassional   • Drug use: No   • Sexual activity: Yes     Partners: Male     Birth control/ protection: Condom       SURGICAL HISTORY   has a past surgical history that includes primary c section (8/23/2013).    CURRENT MEDICATIONS  Home Medications    **Home medications have not yet been reviewed for this encounter**         ALLERGIES  Allergies   Allergen Reactions   • Amoxicillin Nausea       PHYSICAL EXAM   VITAL SIGNS: /99   Pulse (!) 107   Temp 36.8 °C (98.2 °F)   Resp 17   Ht 1.6 m (5' 3\")   Wt 81.6 kg (179 lb 14.3 oz)   SpO2 98%   BMI 31.87 kg/m²  "   Constitutional: Non toxic appearance. Alert in no apparent distress.  HENT: Normocephalic, Atraumatic. Bilateral external ears normal. Nose normal.  Moist mucous membranes.  Oropharynx clear. Swelling to left side of face. No submandibular fullness or trismus. Obvious dental abscess below fractured left posterior molar.   Eyes: Pupils are equal and reactive. Conjunctiva normal.   Neck: Supple, full range of motion  Heart: Regular rate and rhythm.  No murmurs.    Lungs: No respiratory distress, normal work of breathing. Lungs clear to auscultation bilaterally.  Abdomen Soft, no distention.  No tenderness to palpation.  Musculoskeletal: Atraumatic. No obvious deformities noted.  No lower extremity edema.  Skin: Warm, Dry.  No erythema, No rash.   Neurologic: Alert and oriented x3. Moving all extremities spontaneously without focal deficits.  Psychiatric: Affect normal, Mood normal, Appears appropriate and not intoxicated.      DIAGNOSTIC STUDIES    LABS  Personally reviewed by me  Labs Reviewed   CBC WITH DIFFERENTIAL - Abnormal; Notable for the following:        Result Value    WBC 15.9 (*)     MCHC 35.7 (*)     Neutrophils-Polys 80.40 (*)     Lymphocytes 13.50 (*)     Neutrophils (Absolute) 12.80 (*)     All other components within normal limits   BASIC METABOLIC PANEL - Abnormal; Notable for the following:     Potassium 3.3 (*)     Co2 17 (*)     Glucose 126 (*)     Anion Gap 13.0 (*)     All other components within normal limits   LACTIC ACID   ESTIMATED GFR   BLOOD CULTURE   BLOOD CULTURE   CBC WITH DIFFERENTIAL   BASIC METABOLIC PANEL         RADIOLOGY  Personally reviewed by me  [id number]   Final Result      1.  No mandible fracture.   2.  Extensive dental disease.   3.  Possible LEFT mandibular premolar tooth abscess.          ED COURSE  Vitals:    03/18/18 2316 03/19/18 0000 03/19/18 0030 03/19/18 0108   BP: (!) 169/99   (!) 168/97   Pulse: 83 85 90 92   Resp: 17 17 14 20   Temp: 37.3 °C (99.1  °F) 37.3 °C (99.2 °F)  37.4 °C (99.4 °F)   SpO2: 97% 97% 96% 97%   Weight:       Height:             Medications administered:  Clindamycin, Norco    9:56 PM Patient seen and examined at bedside. The patient presents with oral swelling. Ordered for BMP, CBC, and DX-Mandible to evaluate. Patient will be treated with Norco 5-325 mg and Cleocin 600 mg for her symptoms.       MEDICAL DECISION MAKING  Otherwise healthy patient presents with 2 day history of tooth pain and worsening left-sided facial swelling. Patient is afebrile, hypertensive on arrival with otherwise normal vitals. She is nontoxic appearing without signs of systemic illness. She is no evidence of respiratory compromise on exam however does have significant left-sided facial swelling. There is no concern for Adelso angina, peritonsillar abscess, retropharyngeal abscess at this time. She does have evidence of a left mandibular dental abscess with associated very poor dentition.    10:00 PM -I discussed the case with Dr. Dodson, Oral Surgeon, who states he will take the patient for surgery in the morning.     10:19 PM - Consult with Dr. Saha, Hospitalist, who agrees to admit the patient.   Patient is stable at this time for transfer to the floor.    DISPOSITION:  Patient will be admitted to Dr. Saha, Hospitalist, in stable condition.    IMPRESSION  (K04.7) Dental abscess    Results, diagnoses, and treatment options were discussed with the patient and/or family. Patient verbalized understanding of plan of care.    New Prescriptions    No medications on file            Jb HANNAH), am scribing for, and in the presence of, Chelly Hollingsworth M.D..    Electronically signed by: Jb Ge (Aubree), 3/18/2018    Chelly HANNAH M.D. personally performed the services described in this documentation, as scribed by Jb Ge in my presence, and it is both accurate and complete.    The note accurately reflects work and decisions made by me.   Chelly Hollingsworth  3/19/2018  1:45 AM

## 2018-03-19 NOTE — CONSULTS
Oral & Facial Surgery   Consultation Note    ID:  Valerie Ruggiero     HPI:  Pt with multi day hx of tooth pain.  She recently developed facial abscess with increased pain.      Chief Complaint:  Tooth pain, facial swelling    Exam:   Gen:  AAO x 3, NAD  Head:  NCAT  Eyes:  PERRLA, EOMI  Ears:  EAC Clear  Nose:  Nares patent, no d/c, no heme  Mouth:  jimy 25 Mm, tooth # 19/20 with gross decay present.  Tenderness to palpation on tooth as well.  Swelling in area of left mandible.  Tissue locally erythematous, induration present  Throat:  OP Clear    Imaging:   Pano:  Pending.      Assessment:  Facial abscess 2/2 odontogenic infeiton    Plan:    Order Panorex to verify teeth and see if other contributing factor.    Admit to hospitalist for IV Abx (Unasyn preferred, however pt states allergy to Amox (N/V only), if not clindamycin OK).   To OR for I&D with exts (pt just ate and will plan to take to OR tomorrow.  NPO after MN for now.      Thank You,  Jim Dodson, ELVIAS  [phone]

## 2018-03-19 NOTE — ASSESSMENT & PLAN NOTE
This is sepsis (without associated acute organ dysfunction).   Sepsis protocol  Follow-up cultures  IV clindamycin  OMFS to take to OR today for her dental abscess appreciate rec.

## 2018-03-19 NOTE — CARE PLAN
Problem: Communication  Goal: The ability to communicate needs accurately and effectively will improve  Outcome: PROGRESSING SLOWER THAN EXPECTED  Updated on plan of care, no new news.     Problem: Infection  Goal: Will remain free from infection  Outcome: PROGRESSING AS EXPECTED  Treating active infection.

## 2018-03-19 NOTE — PROGRESS NOTES
Pt not on surgery schedule, c/o HA morphine did not work for this, oxycodone with small amt of water given and zofran for nausea left facial swelling noted pt states is worse than is was. Ice to face and HOB is up.

## 2018-03-19 NOTE — PROGRESS NOTES
Drs. Office called xs two with call back now pt talked with office staff and they state  Will be in after office hours.

## 2018-03-19 NOTE — ASSESSMENT & PLAN NOTE
Dr. Garcia with oral surgery is to take to the operating room today   Follow-up culture results  IV antibiotics

## 2018-03-19 NOTE — PROGRESS NOTES
Pt with persistent hypertension. bp at 0500 was 159/103.  notified. Hydralazine 10mg ordered prn. bp remains at 159/105. Another dose given at 0640 per MAR

## 2018-03-19 NOTE — ED NOTES
Pt to Peds 50. Triage note reviewed and agreed.  Pt report her teeth started hurting a few days ago. Pt reports left lower jaw swelling starting this morning. Pt states she took motrin around 1230 and tylenol around 1700.   Swelling to left lower jaw is present. Pt reports pain. Pt denies fevers/N/V. Pt given ice pack.

## 2018-03-20 VITALS
RESPIRATION RATE: 16 BRPM | BODY MASS INDEX: 31.88 KG/M2 | DIASTOLIC BLOOD PRESSURE: 79 MMHG | HEART RATE: 85 BPM | OXYGEN SATURATION: 98 % | TEMPERATURE: 98.4 F | SYSTOLIC BLOOD PRESSURE: 138 MMHG | WEIGHT: 179.9 LBS | HEIGHT: 63 IN

## 2018-03-20 LAB
ANION GAP SERPL CALC-SCNC: 9 MMOL/L (ref 0–11.9)
BUN SERPL-MCNC: 5 MG/DL (ref 8–22)
CALCIUM SERPL-MCNC: 8.4 MG/DL (ref 8.5–10.5)
CHLORIDE SERPL-SCNC: 104 MMOL/L (ref 96–112)
CO2 SERPL-SCNC: 22 MMOL/L (ref 20–33)
CREAT SERPL-MCNC: 0.6 MG/DL (ref 0.5–1.4)
ERYTHROCYTE [DISTWIDTH] IN BLOOD BY AUTOMATED COUNT: 41 FL (ref 35.9–50)
GLUCOSE SERPL-MCNC: 129 MG/DL (ref 65–99)
HCT VFR BLD AUTO: 34.8 % (ref 37–47)
HGB BLD-MCNC: 11.7 G/DL (ref 12–16)
MCH RBC QN AUTO: 29.8 PG (ref 27–33)
MCHC RBC AUTO-ENTMCNC: 33.6 G/DL (ref 33.6–35)
MCV RBC AUTO: 88.8 FL (ref 81.4–97.8)
PLATELET # BLD AUTO: 230 K/UL (ref 164–446)
PMV BLD AUTO: 10.2 FL (ref 9–12.9)
POTASSIUM SERPL-SCNC: 3.2 MMOL/L (ref 3.6–5.5)
RBC # BLD AUTO: 3.92 M/UL (ref 4.2–5.4)
SODIUM SERPL-SCNC: 135 MMOL/L (ref 135–145)
WBC # BLD AUTO: 9.1 K/UL (ref 4.8–10.8)

## 2018-03-20 PROCEDURE — 700102 HCHG RX REV CODE 250 W/ 637 OVERRIDE(OP): Mod: EDC | Performed by: FAMILY MEDICINE

## 2018-03-20 PROCEDURE — 700102 HCHG RX REV CODE 250 W/ 637 OVERRIDE(OP): Mod: EDC | Performed by: DENTIST

## 2018-03-20 PROCEDURE — A9270 NON-COVERED ITEM OR SERVICE: HCPCS | Mod: EDC | Performed by: FAMILY MEDICINE

## 2018-03-20 PROCEDURE — 700101 HCHG RX REV CODE 250: Mod: EDC | Performed by: HOSPITALIST

## 2018-03-20 PROCEDURE — 36415 COLL VENOUS BLD VENIPUNCTURE: CPT | Mod: EDC

## 2018-03-20 PROCEDURE — 700111 HCHG RX REV CODE 636 W/ 250 OVERRIDE (IP): Mod: EDC | Performed by: HOSPITALIST

## 2018-03-20 PROCEDURE — 80048 BASIC METABOLIC PNL TOTAL CA: CPT | Mod: EDC

## 2018-03-20 PROCEDURE — 99239 HOSP IP/OBS DSCHRG MGMT >30: CPT | Performed by: FAMILY MEDICINE

## 2018-03-20 PROCEDURE — A9270 NON-COVERED ITEM OR SERVICE: HCPCS | Mod: EDC | Performed by: HOSPITALIST

## 2018-03-20 PROCEDURE — A9270 NON-COVERED ITEM OR SERVICE: HCPCS | Mod: EDC | Performed by: DENTIST

## 2018-03-20 PROCEDURE — 700102 HCHG RX REV CODE 250 W/ 637 OVERRIDE(OP): Mod: EDC | Performed by: HOSPITALIST

## 2018-03-20 PROCEDURE — 85027 COMPLETE CBC AUTOMATED: CPT | Mod: EDC

## 2018-03-20 RX ORDER — CLINDAMYCIN HYDROCHLORIDE 150 MG/1
300 CAPSULE ORAL 4 TIMES DAILY
Status: DISCONTINUED | OUTPATIENT
Start: 2018-03-20 | End: 2018-03-20 | Stop reason: HOSPADM

## 2018-03-20 RX ORDER — CLINDAMYCIN HYDROCHLORIDE 300 MG/1
300 CAPSULE ORAL 3 TIMES DAILY
Qty: 15 CAP | Refills: 0 | Status: SHIPPED | OUTPATIENT
Start: 2018-03-20 | End: 2018-03-25

## 2018-03-20 RX ORDER — POTASSIUM CHLORIDE 20 MEQ/1
20 TABLET, EXTENDED RELEASE ORAL DAILY
Status: DISCONTINUED | OUTPATIENT
Start: 2018-03-20 | End: 2018-03-20 | Stop reason: HOSPADM

## 2018-03-20 RX ORDER — CHLORHEXIDINE GLUCONATE ORAL RINSE 1.2 MG/ML
15 SOLUTION DENTAL 2 TIMES DAILY
Qty: 210 ML | Refills: 0 | Status: SHIPPED | OUTPATIENT
Start: 2018-03-20 | End: 2018-03-27

## 2018-03-20 RX ADMIN — CLINDAMYCIN HYDROCHLORIDE 300 MG: 150 CAPSULE ORAL at 11:57

## 2018-03-20 RX ADMIN — STANDARDIZED SENNA CONCENTRATE AND DOCUSATE SODIUM 2 TABLET: 8.6; 5 TABLET, FILM COATED ORAL at 08:40

## 2018-03-20 RX ADMIN — CHLORHEXIDINE GLUCONATE 15 ML: 1.2 RINSE ORAL at 08:40

## 2018-03-20 RX ADMIN — POTASSIUM CHLORIDE 20 MEQ: 1500 TABLET, EXTENDED RELEASE ORAL at 08:47

## 2018-03-20 RX ADMIN — HEPARIN SODIUM 5000 UNITS: 5000 INJECTION, SOLUTION INTRAVENOUS; SUBCUTANEOUS at 05:24

## 2018-03-20 RX ADMIN — CLINDAMYCIN IN 5 PERCENT DEXTROSE 600 MG: 12 INJECTION, SOLUTION INTRAVENOUS at 05:24

## 2018-03-20 NOTE — OP REPORT
Operative Report  Oral & Facial Surgery  Dental Extractions    Pt Name:  Valerie Ruggiero     Date of Surgery: 3/19/2018     Surgeon:  Jim Dodson DDS    Assistant: None    Pre-Op Diagnosis:     Dental Decay on Tooth #s 20              Oral Abscess    Post Op Diagnosis:       Same    Operation Performed:           Surgical Extraction of teeth # 20   Complicated Intraoral I&D    Description of Surgery    The pt was brought to the operating room by the anesthesia team.  A time out was performed and consents were verified.  The pt was then uneventfully induced into general anesthesia.  A endotracheal tube was inserted and secured by the anesthesia team.  An oropharyngeal throat pack was placed.  approximately 10 mL of 1% lidocaine with 1:100K epi was administered to the proposed surgical sites.     A 15 blade was used to make an incision on the mandibualr left vestibule in the area of #20.  Blunt dissection was then carried out to the bone and purulence was encountered.  Copious irrigation was placed in the wound until clear return was encountered.      A 15 blade was used to make a sulcular incision along the sites to be extracted.  A full thickness mucoperiosteal flap was elevated.  Tooth #s 20 were removed with appropriate bone removal and sectioning with forceps and elevators.  Rongeurs and bone files were used to smooth the alveolus of the bone.  Copious irrigation was placed in the surgical sites.      The throat pack was then removed and hemostasis achieved.  The pt was then turned back over to the anesthesia team, was awakened and extubated uneventfully and taken to the PACU in stable condition.      Estimated Blood Loss:  10 mL  Needle and sponge count:  Correct  Specimens Removed:  Tooth #s 20    Jim Dodson DDS

## 2018-03-20 NOTE — PROGRESS NOTES
Report received from PACU RN, Noy  Assumed care of Ms Ruggiero at 7075.    Pt is A&O x4.  Pain 2/10. Denies need for medications  Nausea denied  Tolerating clears at this time  Surgical to lt mouth (tooth #20). CORBY. No drainage noted. Slight swelling to lips and tongue.  positive Urine output  negative BM   negative Flatus  Up standby  SCD's refused at this time  Bed in lowest position and locked.  Bed alarm not applicable per Brii Workman  Pt resting comfortably now.  Review plan of care with patient  Call light within reach  Hourly rounds in place  All needs met at this time

## 2018-03-20 NOTE — DISCHARGE INSTRUCTIONS
Discharge Instructions    Discharged to home by car with self. Discharged via wheelchair, hospital escort: Yes.  Special equipment needed: Not Applicable    Be sure to schedule a follow-up appointment with your primary care doctor or any specialists as instructed.     Discharge Plan:   Diet Plan: Discussed (as tolerated)  Activity Level: Discussed (as tolerated)  Confirmed Follow up Appointment: Patient to Call and Schedule Appointment  Confirmed Symptoms Management: Discussed  Medication Reconciliation Updated: Yes  Influenza Vaccine Indication: Patient Refuses    I understand that a diet low in cholesterol, fat, and sodium is recommended for good health. Unless I have been given specific instructions below for another diet, I accept this instruction as my diet prescription.   Other diet: regular    Special Instructions: None    · Is patient discharged on Warfarin / Coumadin?   No     Depression / Suicide Risk    As you are discharged from this [facility], it is important to learn how to keep safe from harming yourself.    Recognize the warning signs:  · Abrupt changes in personality, positive or negative- including increase in energy   · Giving away possessions  · Change in eating patterns- significant weight changes-  positive or negative  · Change in sleeping patterns- unable to sleep or sleeping all the time   · Unwillingness or inability to communicate  · Depression  · Unusual sadness, discouragement and loneliness  · Talk of wanting to die  · Neglect of personal appearance   · Rebelliousness- reckless behavior  · Withdrawal from people/activities they love  · Confusion- inability to concentrate     If you or a loved one observes any of these behaviors or has concerns about self-harm, here's what you can do:  · Talk about it- your feelings and reasons for harming yourself  · Remove any means that you might use to hurt yourself (examples: pills, rope, extension cords, firearm)  · Get professional  help from the community (Mental Health, Substance Abuse, psychological counseling)  · Do not be alone:Call your Safe Contact- someone whom you trust who will be there for you.  · Call your local CRISIS HOTLINE 140-2702 or 659-551-6197  · Call your local Children's Mobile Crisis Response Team [place] (523) 479-2782 or www.[employer]  · Call the toll free National Suicide Prevention Hotlines   · National Suicide Prevention Lifeline 646-201-AIAG (9885)  · National Hope Line Network 800-SUICIDE (772-5786)

## 2018-03-20 NOTE — PROGRESS NOTES
Pt tolerated ext of #20 with I&D well.  Excellent prognosis for recovery.  When pt medically stable, ok to dc from OMFS standpoint.  Recommend d/c with Augmentin, Peridex, pain meds. PT will f/u in my office this week.  Thank you.    Jim Dodson, DDS   [phone]

## 2018-03-20 NOTE — PROGRESS NOTES
PT reports still feeling bad.  She is nauseous.  Swelling still present. Tooth #20 with decay.  Pano reviewed.  Decay present.  PARL on #20 as well as groos decay and fractures.  TTP on left mandible.  Pt reports L V3 paresthesia as well currently.      Discussed r/b/a of ext with I&D including nerve damage, pain, swelling, etc.  All questions answered and pt signed consent.      Plan  To or for I&D   OK for dc when pt medically stable.     Jim Dodson, dDS  [phone]

## 2018-03-20 NOTE — OR NURSING
Pt is comfortable. No bleeding from mouth. No nausea. BP stable. Pt is in sinus rhythm - sinus tachycardia.

## 2018-03-20 NOTE — DISCHARGE SUMMARY
Hospital Medicine Discharge Note     Admit Date:  3/18/2018       Discharge Date:   3/20/2018    Attending Physician:  Chriss Cross     Diagnoses (includes active and resolved):   Principal Problem:    Sepsis (CMS-HCC) POA: Unknown  Active Problems:    Dental abscess POA: Unknown    Hypokalemia POA: Unknown  Resolved Problems:    * No resolved hospital problems. *      Hospital Summary (Brief Narrative):       Patient was admitted for dental abscess, she was started on IV clindamycin. She had a known allergy to 87. Dental surgery was consulted. Patient underwent surgery for the dental abscess. Pain was well-controlled. She had some low potassium which was addressed replacement. Dental surgery is clear for discharge.         Consultants:      Surgery - [facility]    Procedures:        Surgical Extraction of teeth # 20. Complicated Intraoral I&D    Discharge Medications:        Medication Reconciliation Completed     Medication List      START taking these medications      Instructions   chlorhexidine 0.12 % Soln  Commonly known as:  PERIDEX  Notes to patient:  tonight   Take 15 mL by mouth 2 times a day for 7 days.  Dose:  15 mL     clindamycin 300 MG Caps  Commonly known as:  CLEOCIN  Notes to patient:  Take two more doses today.   Take 1 Cap by mouth 3 times a day for 5 days.  Dose:  300 mg        CONTINUE taking these medications      Instructions   TRI-SPRINTEC 0.18/0.215/0.25 MG-35 MCG Tabs  Generic drug:  Norgestim-Eth Estrad Triphasic  Notes to patient:  As before   Take 1 Tab by mouth every day.  Dose:  1 Tab              Disposition:  Home    Diet:   Regular    Activity:   As tolerated    Code status:  Full    Primary Care Provider:    DENISE Godoy    Follow up appointment details :        Jim Dodson D.D.SDong  [facility] [address]  [facility] [address]  [phone]          DENISE Godoy  [address]  [facility] [address]  [phone]    In 2 weeks      DENISE Godoy  [address] [place]  [place] [address]  [phone]          No future appointments.    Pending Studies:        None    Time spent on discharge day patient visit: 35 minutes    #################################################      Most Recent Labs:    Lab Results   Component Value Date/Time    WBC 9.1 03/20/2018 02:56 AM    RBC 3.92 (L) 03/20/2018 02:56 AM    HEMOGLOBIN 11.7 (L) 03/20/2018 02:56 AM    HEMATOCRIT 34.8 (L) 03/20/2018 02:56 AM    MCV 88.8 03/20/2018 02:56 AM    MCH 29.8 03/20/2018 02:56 AM    MCHC 33.6 03/20/2018 02:56 AM    MPV 10.2 03/20/2018 02:56 AM    NEUTSPOLYS 74.10 (H) 03/19/2018 03:58 AM    LYMPHOCYTES 20.30 (L) 03/19/2018 03:58 AM    MONOCYTES 4.50 03/19/2018 03:58 AM    EOSINOPHILS 0.40 03/19/2018 03:58 AM    BASOPHILS 0.30 03/19/2018 03:58 AM    HYPOCHROMIA 1+ 08/23/2013 07:05 AM      Lab Results   Component Value Date/Time    SODIUM 135 03/20/2018 02:56 AM    POTASSIUM 3.2 (L) 03/20/2018 02:56 AM    CHLORIDE 104 03/20/2018 02:56 AM    CO2 22 03/20/2018 02:56 AM    GLUCOSE 129 (H) 03/20/2018 02:56 AM    BUN 5 (L) 03/20/2018 02:56 AM    CREATININE 0.60 03/20/2018 02:56 AM      Lab Results   Component Value Date/Time    ASTSGOT 18 02/11/2013 09:35 AM     No results found for: PROTHROMBTM, INR     Imaging/ Testing:      [id number]   Final Result      1.  No mandible fracture.   2.  Extensive dental disease.   3.  Possible LEFT mandibular premolar tooth abscess.          Instructions:      The patient was instructed to return to the ER in the event of worsening symptoms. I have counseled the patient on the importance of compliance and the patient has agreed to proceed with all medical recommendations and follow up plan indicated above.   The patient understands that all medications come with benefits and risks. Risks may include permanent injury or death and these risks can be minimized with close reassessment and monitoring.

## 2018-03-20 NOTE — DISCHARGE PLANNING
Care Transition Team Assessment    Discussed pt's case with attending MD, bedside RN and pt at bedside during morning rounds. Pt is being d/c home today and will need d/c medications including an oral antibiotic. Pt does not currently have insurance. Pt states she is going to re-apply for Medicaid and doesn't currently have insurance through her employer. MD sent scripts via [employer] to pt's pharmacy ([facility] in [place]). This writer called [facility] and total cost for antibiotic (Clindamycin) is $40.20 and pt's other d/c medication is $4. Updated pt and she states she can afford this. Updated bedside RN.     Completed assessment with pt at bedside. Pt is recently  and works full time as a manager. Pt provided her father for a contact Brennan Powell #[phone]. Pt was IPTA and plans on returning to work when d/c.     Needs:   Pt d/c home today and will fill her medications at [facility] pharmacy.     Information Source  Orientation : Oriented x 4  Information Given By: Patient  Informant's Name: Valerie   Who is responsible for making decisions for patient? : Patient    Readmission Evaluation  Is this a readmission?: No    Elopement Risk  Legal Hold: No  Ambulatory or Self Mobile in Wheelchair: Yes  Disoriented: No  Psychiatric Symptoms: None  History of Wandering: No  Elopement this Admit: No  Vocalizing Wanting to Leave: No  Displays Behaviors, Body Language Wanting to Leave: No-Not at Risk for Elopement  Elopement Risk: Not at Risk for Elopement    Interdisciplinary Discharge Planning  Does Admitting Nurse Feel This Could be a Complex Discharge?: No  Primary Care Physician: NANO Lima  Lives with - Patient's Self Care Capacity: Child Less than 18 Years of Age  Patient or legal guardian wants to designate a caregiver (see row info): No  Support Systems: None  Housing / Facility: 2 Story House  Do You Take your Prescribed Medications Regularly: Yes  Able to Return to Previous ADL's: Yes  Mobility Issues:  No  Prior Services: None  Assistance Needed: No  Durable Medical Equipment: Not Applicable    Discharge Preparedness  What is your plan after discharge?: Home with help  What are your discharge supports?: Parent  Prior Functional Level: Ambulatory, Drives Self, Independent with Activities of Daily Living, Independent with Medication Management  Difficulity with ADLs: None  Difficulity with IADLs: None    Functional Assesment  Prior Functional Level: Ambulatory, Drives Self, Independent with Activities of Daily Living, Independent with Medication Management    Finances  Financial Barriers to Discharge: Yes  Source of Income: Employed  Prescription Coverage: No  Prescription Coverage Comments: Pt states she is going to reapply for Medicaid and does not currently have insurance through her employer.     Vision / Hearing Impairment  Vision Impairment : No  Hearing Impairment : No    Values / Beliefs / Concerns  Values / Beliefs Concerns : No    Advance Directive  Advance Directive?: None    Domestic Abuse  Have you ever been the victim of abuse or violence?: No  Physical Abuse or Sexual Abuse: No  Verbal Abuse or Emotional Abuse: No         Discharge Risks or Barriers  Discharge risks or barriers?: Uninsured / underinsured, Complex medical needs  Patient risk factors: Complex medical needs, Uninsured or underinsured    Anticipated Discharge Information  Anticipated discharge disposition: Home  Discharge Address: [address] [place] [address] [place], [place]   Discharge Contact Phone Number: [phone]

## 2018-03-20 NOTE — PROGRESS NOTES
Alert, mouth with no bleeding noted, she does have trouble opening her mouth, less facial swelling today. States much less pain. Talking and swallowing well.

## 2018-03-24 LAB
BACTERIA BLD CULT: NORMAL
BACTERIA BLD CULT: NORMAL
SIGNIFICANT IND 70042: NORMAL
SIGNIFICANT IND 70042: NORMAL
SITE SITE: NORMAL
SITE SITE: NORMAL
SOURCE SOURCE: NORMAL
SOURCE SOURCE: NORMAL

## 2018-05-01 NOTE — ADDENDUM NOTE
Encounter addended by: Silvia Devries R.N. on: 5/1/2018  9:46 AM<BR>    Actions taken: Flowsheet accepted

## 2018-05-11 ENCOUNTER — OFFICE VISIT (OUTPATIENT)
Dept: MEDICAL GROUP | Facility: MEDICAL CENTER | Age: 33
End: 2018-05-11
Attending: INTERNAL MEDICINE
Payer: MEDICAID

## 2018-05-11 VITALS
DIASTOLIC BLOOD PRESSURE: 98 MMHG | HEART RATE: 88 BPM | OXYGEN SATURATION: 98 % | HEIGHT: 63 IN | BODY MASS INDEX: 32.6 KG/M2 | WEIGHT: 184 LBS | SYSTOLIC BLOOD PRESSURE: 144 MMHG | TEMPERATURE: 97.7 F | RESPIRATION RATE: 16 BRPM

## 2018-05-11 DIAGNOSIS — Z30.41 ENCOUNTER FOR SURVEILLANCE OF CONTRACEPTIVE PILLS: ICD-10-CM

## 2018-05-11 DIAGNOSIS — I10 ESSENTIAL HYPERTENSION: ICD-10-CM

## 2018-05-11 DIAGNOSIS — E66.9 OBESITY (BMI 30-39.9): ICD-10-CM

## 2018-05-11 PROBLEM — E87.6 HYPOKALEMIA: Status: RESOLVED | Noted: 2018-03-19 | Resolved: 2018-05-11

## 2018-05-11 PROBLEM — K04.7 DENTAL ABSCESS: Status: RESOLVED | Noted: 2018-03-19 | Resolved: 2018-05-11

## 2018-05-11 PROBLEM — Z30.011 INITIATION OF OCP (BCP): Status: ACTIVE | Noted: 2018-05-11

## 2018-05-11 PROBLEM — A41.9 SEPSIS (HCC): Status: RESOLVED | Noted: 2018-03-19 | Resolved: 2018-05-11

## 2018-05-11 PROCEDURE — 99213 OFFICE O/P EST LOW 20 MIN: CPT | Performed by: INTERNAL MEDICINE

## 2018-05-11 PROCEDURE — 99214 OFFICE O/P EST MOD 30 MIN: CPT | Performed by: INTERNAL MEDICINE

## 2018-05-11 RX ORDER — AMLODIPINE BESYLATE 5 MG/1
5 TABLET ORAL DAILY
Qty: 30 TAB | Refills: 1 | Status: SHIPPED | OUTPATIENT
Start: 2018-05-11 | End: 2018-06-08 | Stop reason: SDUPTHER

## 2018-05-11 RX ORDER — NORGESTIMATE AND ETHINYL ESTRADIOL 7DAYSX3 28
1 KIT ORAL DAILY
Qty: 28 TAB | Refills: 11 | Status: SHIPPED | OUTPATIENT
Start: 2018-05-11 | End: 2019-04-08 | Stop reason: SDUPTHER

## 2018-05-11 ASSESSMENT — PAIN SCALES - GENERAL: PAINLEVEL: NO PAIN

## 2018-05-11 NOTE — PROGRESS NOTES
Valerie Ruggiero is a 32 y.o. female here for elevated blood pressure, birth control refill, establish care. Previous patient of Maury Lima.  HPI:    Essential hypertension  Patient has a very strong family history of hypertension.  Each of her parents has hypertension as well as her maternal grandmother. She reports that her mom was diagnosed with hypertension at a very young age and is on multiple medications. Patient was recently hospitalized for a dental abscess and was told that her blood pressure was difficult to control during the surgery. In clinic today, she is 144/98. One year ago she was 136/88. She does not have a home blood pressure machine and does not check on her blood pressure regularly. She does not do any regular exercise however she is active at her job and walks or is on her feet all day long. She reports a fairly healthy diet low in process and fast foods. She does not add extra salt to her food.    Encounter for surveillance of contraceptive pills  Patient is currently sexually active and uses Tri-Sprintec for birth control. She reports this method is effective for her. She will occasionally forget to take a pill and will take 2 the next day. We discussed using backup method for contraception when this does happen. She has regular periods without breakthrough bleeding. She is currently taking her last pack of OCPs and will have her period next week.    Current medicines (including changes today)  Current Outpatient Prescriptions   Medication Sig Dispense Refill   • TRI-SPRINTEC 0.18/0.215/0.25 MG-35 MCG Tab Take 1 Tab by mouth every day. 28 Tab 11   • amLODIPine (NORVASC) 5 MG Tab Take 1 Tab by mouth every day. 30 Tab 1     No current facility-administered medications for this visit.      She  has a past medical history of Hypertension and Irregular periods (11/25/2014).  She  has a past surgical history that includes primary c section (8/23/2013); dental extraction(s) (3/19/2018); and  "submandible abscess incision and drainage (Left, 3/19/2018).  Social History   Substance Use Topics   • Smoking status: Never Smoker   • Smokeless tobacco: Never Used   • Alcohol use Yes      Comment: 2-3 drinks every few months     Social History     Social History Narrative   • No narrative on file     Family History   Problem Relation Age of Onset   • Hypertension Mother    • Hypertension Father    • Hypertension Maternal Grandmother    • Cancer Maternal Grandmother      breast   • Cancer Maternal Grandfather      prostate   • Cancer Paternal Grandmother      breast   • Heart Disease Paternal Grandfather    • Diabetes Neg Hx    • Stroke Neg Hx          ROS  As above in HPI  All other systems reviewed and are negative     Objective:     Blood pressure 144/98, pulse 88, temperature 36.5 °C (97.7 °F), resp. rate 16, height 1.6 m (5' 2.99\"), weight 83.5 kg (184 lb), SpO2 98 %, not currently breastfeeding. Body mass index is 32.6 kg/m².  Physical Exam:    Constitutional: Alert, no distress.  Skin: Warm, dry, good turgor, no rashes in visible areas.  Eye: Equal, round and reactive, conjunctiva clear, lids normal.  ENMT: Lips without lesions, good dentition, oropharynx clear, TM's clear bilaterally.  Neck: Trachea midline, no masses, no thyromegaly. No cervical or supraclavicular lymphadenopathy.  Respiratory: Unlabored respiratory effort, lungs clear to auscultation, no wheezes, no ronchi.  Cardiovascular: Regular rate and rhythm, no murmurs appreciated, no lower extremity edema.  Abdomen: Soft, non-tender, no masses, no hepatosplenomegaly.  Psych: Alert and oriented x3, normal affect and mood.       Ref. Range 3/20/2018 02:56   Sodium Latest Ref Range: 135 - 145 mmol/L 135   Potassium Latest Ref Range: 3.6 - 5.5 mmol/L 3.2 (L)   Chloride Latest Ref Range: 96 - 112 mmol/L 104   Co2 Latest Ref Range: 20 - 33 mmol/L 22   Anion Gap Latest Ref Range: 0.0 - 11.9  9.0   Glucose Latest Ref Range: 65 - 99 mg/dL 129 (H)   Bun " Latest Ref Range: 8 - 22 mg/dL 5 (L)   Creatinine Latest Ref Range: 0.50 - 1.40 mg/dL 0.60   GFR If  Latest Ref Range: >60 mL/min/1.73 m 2 >60   GFR If Non  Latest Ref Range: >60 mL/min/1.73 m 2 >60   Calcium Latest Ref Range: 8.5 - 10.5 mg/dL 8.4 (L)         Assessment and Plan:   The following treatment plan was discussed    1. Encounter for surveillance of contraceptive pills  Tolerating well. Education was given regarding using a backup method of contraception if she misses a pill, and continued condom use to prevent STDs  - TRI-SPRINTEC 0.18/0.215/0.25 MG-35 MCG Tab; Take 1 Tab by mouth every day.  Dispense: 28 Tab; Refill: 11    3. Essential hypertension  New diagnosis for patient. We will start her on amlodipine 5 mg daily. Encouraged her to purchase a home blood pressure machine and start monitoring. She will follow-up in 3-4 weeks.  -Start amlodipine 5 mg daily  -Home blood pressure monitoring  -3-4 week follow-up    4. Obesity (BMI 30-39.9)  - Patient identified as having weight management issue.  Appropriate orders and counseling given.        Followup: Return in about 4 weeks (around 6/8/2018) for hypertension.

## 2018-05-11 NOTE — ASSESSMENT & PLAN NOTE
Patient has a very strong family history of hypertension.  Each of her parents has hypertension as well as her maternal grandmother. She reports that her mom was diagnosed with hypertension at a very young age and is on multiple medications. Patient was recently hospitalized for a dental abscess and was told that her blood pressure was difficult to control during the surgery. In clinic today, she is 144/98. One year ago she was 136/88. She does not have a home blood pressure machine and does not check on her blood pressure regularly. She does not do any regular exercise however she is active at her job and walks or is on her feet all day long. She reports a fairly healthy diet low in process and fast foods. She does not add extra salt to her food.

## 2018-06-08 ENCOUNTER — OFFICE VISIT (OUTPATIENT)
Dept: MEDICAL GROUP | Facility: MEDICAL CENTER | Age: 33
End: 2018-06-08
Attending: INTERNAL MEDICINE
Payer: MEDICAID

## 2018-06-08 VITALS
OXYGEN SATURATION: 98 % | HEART RATE: 90 BPM | WEIGHT: 184 LBS | TEMPERATURE: 97.9 F | SYSTOLIC BLOOD PRESSURE: 118 MMHG | RESPIRATION RATE: 16 BRPM | BODY MASS INDEX: 32.6 KG/M2 | HEIGHT: 63 IN | DIASTOLIC BLOOD PRESSURE: 78 MMHG

## 2018-06-08 DIAGNOSIS — I10 ESSENTIAL HYPERTENSION: ICD-10-CM

## 2018-06-08 PROCEDURE — 99212 OFFICE O/P EST SF 10 MIN: CPT | Performed by: INTERNAL MEDICINE

## 2018-06-08 PROCEDURE — 99214 OFFICE O/P EST MOD 30 MIN: CPT | Performed by: INTERNAL MEDICINE

## 2018-06-08 RX ORDER — BLOOD PRESSURE TEST KIT
1 KIT MISCELLANEOUS ONCE
Qty: 1 KIT | Refills: 0 | Status: SHIPPED
Start: 2018-06-08 | End: 2018-06-08

## 2018-06-08 RX ORDER — AMLODIPINE BESYLATE 5 MG/1
5 TABLET ORAL DAILY
Qty: 30 TAB | Refills: 6 | Status: SHIPPED | OUTPATIENT
Start: 2018-06-08 | End: 2019-03-01 | Stop reason: SDUPTHER

## 2018-06-08 ASSESSMENT — PAIN SCALES - GENERAL: PAINLEVEL: NO PAIN

## 2018-06-08 NOTE — ASSESSMENT & PLAN NOTE
Patient presents for follow-up of pressure. In clinic today she is 118/74. She has been taking her amlodipine every day without any side effects. She denies lower extremity swelling. She was unable to get a blood pressure cuff at home due to the cost.

## 2018-06-08 NOTE — PROGRESS NOTES
"Subjective:   Valerie Ruggiero is a 32 y.o. female here today for follow up blood pressure    Essential hypertension  Patient presents for follow-up of pressure. In clinic today she is 118/74. She has been taking her amlodipine every day without any side effects. She denies lower extremity swelling. She was unable to get a blood pressure cuff at home due to the cost.       Current medicines (including changes today)  Current Outpatient Prescriptions   Medication Sig Dispense Refill   • amLODIPine (NORVASC) 5 MG Tab Take 1 Tab by mouth every day. 30 Tab 6   • Blood Pressure Kit 1 Each by Does not apply route Once for 1 dose. 1 Kit 0   • TRI-SPRINTEC 0.18/0.215/0.25 MG-35 MCG Tab Take 1 Tab by mouth every day. 28 Tab 11     No current facility-administered medications for this visit.      She  has a past medical history of Hypertension and Irregular periods (11/25/2014).    ROS   Denies chest pain, shortness of breath  As above in HPI     Objective:     Blood pressure 118/78, pulse 90, temperature 36.6 °C (97.9 °F), resp. rate 16, height 1.6 m (5' 2.99\"), weight 83.5 kg (184 lb), SpO2 98 %, not currently breastfeeding. Body mass index is 32.6 kg/m².   Physical Exam:  Constitutional: Alert, no distress.  Skin: Warm, dry, good turgor, no rashes in visible areas.  Eye: Equal, round and reactive, conjunctiva clear, lids normal.  Cardiovascular: Regular rate and rhythm, no murmurs appreciated, no lower extremity edema  Psych: Alert and oriented x3, normal affect and mood.      Assessment and Plan:   The following treatment plan was discussed    1. Essential hypertension  Blood pressure is back in normal range with 5 mg of amlodipine. We will continue her on her current medications. I've written a prescription for blood pressure Kit which hopefully she can obtain from [facility] for home monitoring. We discussed that if she is consistently above 130/90, she needs to let us know. Otherwise, we will see her back in 3 " months.  -home BP monitoring  - amLODIPine (NORVASC) 5 MG Tab; Take 1 Tab by mouth every day.  Dispense: 30 Tab; Refill: 6  - Blood Pressure Kit; 1 Each by Does not apply route Once for 1 dose.  Dispense: 1 Kit; Refill: 0  -f/u 3 months      Followup: Return in about 3 months (around 9/8/2018) for hypertension.

## 2019-03-01 DIAGNOSIS — I10 ESSENTIAL HYPERTENSION: ICD-10-CM

## 2019-03-04 RX ORDER — AMLODIPINE BESYLATE 5 MG/1
5 TABLET ORAL DAILY
Qty: 30 TAB | Refills: 2 | Status: SHIPPED | OUTPATIENT
Start: 2019-03-04 | End: 2019-03-08 | Stop reason: SDUPTHER

## 2019-03-08 ENCOUNTER — OFFICE VISIT (OUTPATIENT)
Dept: MEDICAL GROUP | Facility: MEDICAL CENTER | Age: 34
End: 2019-03-08
Attending: INTERNAL MEDICINE
Payer: MEDICAID

## 2019-03-08 VITALS
TEMPERATURE: 97.9 F | WEIGHT: 201 LBS | DIASTOLIC BLOOD PRESSURE: 80 MMHG | RESPIRATION RATE: 16 BRPM | SYSTOLIC BLOOD PRESSURE: 138 MMHG | HEART RATE: 72 BPM | HEIGHT: 63 IN | OXYGEN SATURATION: 94 % | BODY MASS INDEX: 35.61 KG/M2

## 2019-03-08 DIAGNOSIS — I10 ESSENTIAL HYPERTENSION: Primary | ICD-10-CM

## 2019-03-08 DIAGNOSIS — Z23 NEED FOR VACCINATION: ICD-10-CM

## 2019-03-08 PROCEDURE — 99213 OFFICE O/P EST LOW 20 MIN: CPT | Performed by: INTERNAL MEDICINE

## 2019-03-08 PROCEDURE — 99213 OFFICE O/P EST LOW 20 MIN: CPT | Mod: 25 | Performed by: INTERNAL MEDICINE

## 2019-03-08 PROCEDURE — 90686 IIV4 VACC NO PRSV 0.5 ML IM: CPT

## 2019-03-08 RX ORDER — AMLODIPINE BESYLATE 5 MG/1
5 TABLET ORAL DAILY
Qty: 30 TAB | Refills: 11 | Status: SHIPPED | OUTPATIENT
Start: 2019-03-08 | End: 2019-08-21 | Stop reason: SDUPTHER

## 2019-03-08 NOTE — ASSESSMENT & PLAN NOTE
She was diagnosed with new onset hypertension in May 2018 and was started on amlodipine 5 mg daily.  She came back for a follow-up visit in June and her blood pressure was within normal limits.  Unfortunately, she failed to have a home blood pressure monitoring due to affordability issues.  She stated that she has been checking her blood pressure at a local drugstore and frequently, it was usually indicated within normal limits but cannot remember the exact numbers.  Her blood pressure today in the office with a slightly elevated at 138/80.  She told me that she recently ran out of her medication for about 2 weeks, during that period of time, she was experiencing some headaches and pounding sensation along with increased fatigue.  She resumed taking her amlodipine 5 mg about 2 days ago and since then all her symptoms has resolved.  Denies symptoms low BP: light-headed, tunnel-vision, unusual fatigue.   Denies symptoms high BP:pounding headache, visual changes, palpitations, flushed face.   Denies medicine side effects: unusual fatigue, slow heartbeat, foot/leg swelling, cough.

## 2019-03-08 NOTE — PROGRESS NOTES
Chief Complaint   Patient presents with   • Medication Refill       Subjective:     HPI:   Valerie presents today with the following.    Essential hypertension  She was diagnosed with new onset hypertension in May 2018 and was started on amlodipine 5 mg daily.  She came back for a follow-up visit in June and her blood pressure was within normal limits.  Unfortunately, she failed to have a home blood pressure monitoring due to affordability issues.  She stated that she has been checking her blood pressure at a local drugstore and frequently, it was usually indicated within normal limits but cannot remember the exact numbers.  Her blood pressure today in the office with a slightly elevated at 138/80.  She told me that she recently ran out of her medication for about 2 weeks, during that period of time, she was experiencing some headaches and pounding sensation along with increased fatigue.  She resumed taking her amlodipine 5 mg about 2 days ago and since then all her symptoms has resolved.  Denies symptoms low BP: light-headed, tunnel-vision, unusual fatigue.   Denies symptoms high BP:pounding headache, visual changes, palpitations, flushed face.   Denies medicine side effects: unusual fatigue, slow heartbeat, foot/leg swelling, cough.        Patient Active Problem List    Diagnosis Date Noted   • Encounter for surveillance of contraceptive pills 05/11/2018   • Essential hypertension 05/11/2018   • Obesity (BMI 30-39.9) 05/11/2018       Current Outpatient Prescriptions   Medication Sig Dispense Refill   • amLODIPine (NORVASC) 5 MG Tab Take 1 Tab by mouth every day. Needs appointment within 90 days for future refills 30 Tab 11   • TRI-SPRINTEC 0.18/0.215/0.25 MG-35 MCG Tab Take 1 Tab by mouth every day. 28 Tab 11     No current facility-administered medications for this visit.        Allergies as of 03/08/2019 - Reviewed 03/08/2019   Allergen Reaction Noted   • Amoxicillin Nausea 01/22/2013   • Percocet  [oxycodone-acetaminophen] Nausea 03/19/2018        Past Medical History:   Diagnosis Date   • Hypertension    • Irregular periods 11/25/2014       Past Surgical History:   Procedure Laterality Date   • DENTAL EXTRACTION(S)  3/19/2018    Procedure: DENTAL EXTRACTION(S);  Surgeon: Jim Dodson D.D.S.;  Location: SURGERY [facility];  Service: Dental   • SUBMANDIBLE ABSCESS INCISION AND DRAINAGE Left 3/19/2018    Procedure: Facial Incision and Drainage  extraction of teeth number 20;  Surgeon: Jim Dodson D.D.S.;  Location: SURGERY [facility];  Service: Dental   • PRIMARY C SECTION  8/23/2013    Performed by Lacie Parra M.D. at [facility]       Social History   Substance Use Topics   • Smoking status: Never Smoker   • Smokeless tobacco: Never Used   • Alcohol use Yes      Comment: 2-3 drinks every few months       Family History   Problem Relation Age of Onset   • Hypertension Mother    • Hypertension Father    • Hypertension Maternal Grandmother    • Cancer Maternal Grandmother         breast   • Cancer Maternal Grandfather         prostate   • Cancer Paternal Grandmother         breast   • Heart Disease Paternal Grandfather    • Diabetes Neg Hx    • Stroke Neg Hx        ROS:     - Constitutional: Negative for fever, chills, unexpected weight change, and fatigue/generalized weakness.     - HEENT: Negative for headaches, vision changes, hearing changes, ear pain, ear discharge, sinus congestion, or sore throat.     - Respiratory: Negative for cough, sputum production, dyspnea and wheezing.    - Cardiovascular: Negative for chest pain or palpitations.      - Gastrointestinal: Negative for heartburn, nausea, vomiting, abdominal pain, diarrhea or constipation.     - Genitourinary: Negative for dysuria, polyuria or urinary urgency.    - Musculoskeletal: Negative for myalgias, back pain, and joint pain.     - Skin: Negative for rash, itching, cyanotic skin color change.     -  "Psychiatric/Behavioral: Negative for depression or suicidal/homicidal ideation.       Physical Exam:     Blood pressure 138/80, pulse 72, temperature 36.6 °C (97.9 °F), temperature source Temporal, resp. rate 16, height 1.6 m (5' 2.99\"), weight 91.2 kg (201 lb), SpO2 94 %, not currently breastfeeding. Body mass index is 35.61 kg/m².   Gen:         Alert and oriented, No apparent distress.  Neck:        No Lymphadenopathy or Bruits.  Lungs:     Clear to auscultation bilaterally  CV:          Regular rate and rhythm. No murmurs, rubs or gallops.       Ext:          No clubbing, cyanosis, edema.    Data:     [facility] 3/2018    Assessment and Plan:     33 y.o. female with the following issues.    1. Essential hypertension  New to me, chronic controlled problem.  Was diagnosed in May 2018 and currently taking amlodipine 5 mg daily.  Discussed to continue on amlodipine 5 mg daily, she has an isolated blood pressure reading today in the office with a slightly elevated but per patient her blood pressure is usually slightly elevated in clinic.  Questionable underlying whitecoat hypertension as well.  Doing well symptomatically on this dose.  Advised to continue with amlodipine 5 mg daily, will check her [facility] for this year for monitoring.  Advised continuing to monitor blood pressure in an outpatient setting, either get a home blood pressure monitor or regular checks at a local drugstore and keeping a blood pressure log.  Also discussed, the goal for blood pressure for her age is less than 130/80.  If her blood pressure is consistently higher than that reading she should be contacting our office to adjust her medication.    - Comp Metabolic Panel; Future  - amLODIPine (NORVASC) 5 MG Tab; Take 1 Tab by mouth every day. Needs appointment within 90 days for future refills  Dispense: 30 Tab; Refill: 11    2. Need for vaccination  Given today in the office.  - Influenza Vaccine Quad Injection >3Y (PF)      Follow Up:      No Follow-up " on file.      Please note that this dictation was created using voice recognition software. I have made every reasonable attempt to correct obvious errors, but I expect that there are errors of grammar and possibly content that I did not discover before finalizing the note.    Signed by: Trista Brock

## 2019-04-08 DIAGNOSIS — Z30.41 ENCOUNTER FOR SURVEILLANCE OF CONTRACEPTIVE PILLS: ICD-10-CM

## 2019-04-09 RX ORDER — NORGESTIMATE AND ETHINYL ESTRADIOL 7DAYSX3 28
1 KIT ORAL DAILY
Qty: 28 TAB | Refills: 2 | Status: SHIPPED | OUTPATIENT
Start: 2019-04-09 | End: 2019-08-21 | Stop reason: SDUPTHER

## 2019-05-09 ENCOUNTER — TELEPHONE (OUTPATIENT)
Dept: MEDICAL GROUP | Facility: MEDICAL CENTER | Age: 34
End: 2019-05-09

## 2019-05-10 NOTE — TELEPHONE ENCOUNTER
FINAL PRIOR AUTHORIZATION STATUS:    1.  Name of Medication & Dose: trisprintec      2. Prior Auth Status: Approved through 5/9/20     3. Action Taken: Pharmacy Notified: yes Patient Notified: yes

## 2019-08-21 ENCOUNTER — HOSPITAL ENCOUNTER (OUTPATIENT)
Facility: MEDICAL CENTER | Age: 34
End: 2019-08-21
Attending: INTERNAL MEDICINE
Payer: MEDICAID

## 2019-08-21 ENCOUNTER — OFFICE VISIT (OUTPATIENT)
Dept: MEDICAL GROUP | Facility: MEDICAL CENTER | Age: 34
End: 2019-08-21
Attending: INTERNAL MEDICINE
Payer: MEDICAID

## 2019-08-21 ENCOUNTER — HOSPITAL ENCOUNTER (OUTPATIENT)
Dept: LAB | Facility: MEDICAL CENTER | Age: 34
End: 2019-08-21
Attending: INTERNAL MEDICINE
Payer: MEDICAID

## 2019-08-21 VITALS
HEIGHT: 63 IN | OXYGEN SATURATION: 97 % | DIASTOLIC BLOOD PRESSURE: 78 MMHG | BODY MASS INDEX: 36.5 KG/M2 | HEART RATE: 82 BPM | SYSTOLIC BLOOD PRESSURE: 122 MMHG | WEIGHT: 206 LBS | TEMPERATURE: 98.5 F | RESPIRATION RATE: 16 BRPM

## 2019-08-21 DIAGNOSIS — I10 ESSENTIAL HYPERTENSION: ICD-10-CM

## 2019-08-21 DIAGNOSIS — N89.8 VAGINAL DISCHARGE: ICD-10-CM

## 2019-08-21 DIAGNOSIS — N89.8 VAGINAL LESION: ICD-10-CM

## 2019-08-21 DIAGNOSIS — Z30.41 ENCOUNTER FOR SURVEILLANCE OF CONTRACEPTIVE PILLS: ICD-10-CM

## 2019-08-21 PROBLEM — E66.9 OBESITY (BMI 30-39.9): Status: RESOLVED | Noted: 2018-05-11 | Resolved: 2019-08-21

## 2019-08-21 PROBLEM — E66.9 OBESITY (BMI 35.0-39.9 WITHOUT COMORBIDITY): Status: ACTIVE | Noted: 2019-08-21

## 2019-08-21 LAB
ALBUMIN SERPL BCP-MCNC: 4.3 G/DL (ref 3.2–4.9)
ALBUMIN/GLOB SERPL: 1.4 G/DL
ALP SERPL-CCNC: 83 U/L (ref 30–99)
ALT SERPL-CCNC: 24 U/L (ref 2–50)
ANION GAP SERPL CALC-SCNC: 8 MMOL/L (ref 0–11.9)
AST SERPL-CCNC: 26 U/L (ref 12–45)
BILIRUB SERPL-MCNC: 0.6 MG/DL (ref 0.1–1.5)
BUN SERPL-MCNC: 8 MG/DL (ref 8–22)
CALCIUM SERPL-MCNC: 9.2 MG/DL (ref 8.5–10.5)
CHLORIDE SERPL-SCNC: 104 MMOL/L (ref 96–112)
CO2 SERPL-SCNC: 22 MMOL/L (ref 20–33)
CREAT SERPL-MCNC: 0.71 MG/DL (ref 0.5–1.4)
GLOBULIN SER CALC-MCNC: 3 G/DL (ref 1.9–3.5)
GLUCOSE SERPL-MCNC: 88 MG/DL (ref 65–99)
POTASSIUM SERPL-SCNC: 3.9 MMOL/L (ref 3.6–5.5)
PROT SERPL-MCNC: 7.3 G/DL (ref 6–8.2)
SODIUM SERPL-SCNC: 134 MMOL/L (ref 135–145)

## 2019-08-21 PROCEDURE — 87660 TRICHOMONAS VAGIN DIR PROBE: CPT

## 2019-08-21 PROCEDURE — 87529 HSV DNA AMP PROBE: CPT

## 2019-08-21 PROCEDURE — 99213 OFFICE O/P EST LOW 20 MIN: CPT | Performed by: INTERNAL MEDICINE

## 2019-08-21 PROCEDURE — 87510 GARDNER VAG DNA DIR PROBE: CPT

## 2019-08-21 PROCEDURE — 87480 CANDIDA DNA DIR PROBE: CPT

## 2019-08-21 PROCEDURE — 99214 OFFICE O/P EST MOD 30 MIN: CPT | Performed by: INTERNAL MEDICINE

## 2019-08-21 PROCEDURE — 87591 N.GONORRHOEAE DNA AMP PROB: CPT

## 2019-08-21 PROCEDURE — 87491 CHLMYD TRACH DNA AMP PROBE: CPT

## 2019-08-21 PROCEDURE — 36415 COLL VENOUS BLD VENIPUNCTURE: CPT

## 2019-08-21 PROCEDURE — 80053 COMPREHEN METABOLIC PANEL: CPT

## 2019-08-21 RX ORDER — AMLODIPINE BESYLATE 5 MG/1
5 TABLET ORAL DAILY
Qty: 30 TAB | Refills: 11 | Status: SHIPPED | OUTPATIENT
Start: 2019-08-21 | End: 2022-10-26 | Stop reason: SDUPTHER

## 2019-08-21 RX ORDER — NORGESTIMATE AND ETHINYL ESTRADIOL 7DAYSX3 28
1 KIT ORAL DAILY
Qty: 28 TAB | Refills: 11 | Status: SHIPPED | OUTPATIENT
Start: 2019-08-21 | End: 2022-10-26

## 2019-08-21 ASSESSMENT — PAIN SCALES - GENERAL: PAINLEVEL: 3=SLIGHT PAIN

## 2019-08-21 NOTE — ASSESSMENT & PLAN NOTE
She continues to take the Tri-Sprintec.  She has not had refills for 2 months so she has been abstinent.  She reports regular menses with her LMP approximately 1 week ago.  She would like to restart the OCPs.

## 2019-08-21 NOTE — ASSESSMENT & PLAN NOTE
She is complaining of a small amount of vaginal discharge that is yellow and stringy.  States that she had some yesterday.  She will occasionally get some thick white discharge.  She also reports some itching in the genital region.

## 2019-08-21 NOTE — PROGRESS NOTES
Subjective:   Valerie Ruggiero is a 33 y.o. female here today for vaginal lesion for 5 days, follow-up hypertension    Vaginal lesion  Approximately 5 days ago, she noticed a painful area over her labia majora on the left.  States that it was initially blistered and then formed scabs.  She is also been having you.  She has no known history of genital herpes or sexual partners who have had this that she knows of.  She states that her partner has not had any similar symptoms.  She denies fevers, chills, dysuria, had some slight pelvic pain initially when the sores broke out but this has completely resolved with 1 dose of ibuprofen.      Encounter for surveillance of contraceptive pills  She continues to take the Tri-Sprintec.  She has not had refills for 2 months so she has been abstinent.  She reports regular menses with her LMP approximately 1 week ago.  She would like to restart the OCPs.    Essential hypertension  She continues to take amlodipine 5 mg daily.  She reports good control of her blood pressures.  She has a home cuff and she occasionally checks.  Last night she was 115/64.  In clinic today she is 122/78.  She is tolerating the amlodipine without side effects.    Vaginal discharge  She is complaining of a small amount of vaginal discharge that is yellow and stringy.  States that she had some yesterday.  She will occasionally get some thick white discharge.  She also reports some itching in the genital region.       Current medicines (including changes today)  Current Outpatient Medications   Medication Sig Dispense Refill   • TRI-SPRINTEC 0.18/0.215/0.25 MG-35 MCG Tab Take 1 Tab by mouth every day. 28 Tab 11   • amLODIPine (NORVASC) 5 MG Tab Take 1 Tab by mouth every day. 30 Tab 11     No current facility-administered medications for this visit.      She  has a past medical history of Hypertension and Irregular periods (11/25/2014).    ROS   Denies chest pain, shortness of breath  As above in  HPI     Objective:     Vitals:    08/21/19 0824   BP: 122/78   Pulse: 82   Resp: 16   Temp: 36.9 °C (98.5 °F)   SpO2: 97%     Body mass index is 36.5 kg/m².   Physical Exam:  Constitutional: Alert, no distress.  Skin: Warm, dry, good turgor, no rashes in visible areas.  Eye: Equal, round and reactive, conjunctiva clear, lids normal.  : Left labia majora with cluster of scabbed lesions that were previously blistered, mild inflammation, small amount of thick white discharge at vaginal orifice which was swabbed      Assessment and Plan:   The following treatment plan was discussed    1. Vaginal lesion  Appears typical for genital herpes.  We will confirm this with HSV serology given she has never been diagnosed in the past.  It is too late in the disease course to start her on acyclovir or perform a Tzanck smear as there are no blisters remaining.  We discussed the course and natural history of HSV.  She is aware that if symptoms recur and she does test positive that she should let us know immediately so we can start her on treatment.  She is aware that treatment is only effective if initiated in the first 48 to 72 hours and that she is outside of this window  - HSV (HERPES SIMPLEX VIRUS) BY PCR (BLOOD); Future    2. Encounter for surveillance of contraceptive pills  - TRI-SPRINTEC 0.18/0.215/0.25 MG-35 MCG Tab; Take 1 Tab by mouth every day.  Dispense: 28 Tab; Refill: 11    3. Essential hypertension  Stable, well-controlled with current medication which was refilled today  - amLODIPine (NORVASC) 5 MG Tab; Take 1 Tab by mouth every day.  Dispense: 30 Tab; Refill: 11    4. Vaginal discharge  We will test for alternative vaginal infections as below  - VAGINAL PATHOGENS DNA PANEL; Future  - CHLAMYDIA/GC PCR URINE OR SWAB; Future        Followup: Return in about 6 months (around 2/21/2020), or if symptoms worsen or fail to improve, for hypertension.

## 2019-08-21 NOTE — ASSESSMENT & PLAN NOTE
Approximately 5 days ago, she noticed a painful area over her labia majora on the left.  States that it was initially blistered and then formed scabs.  She is also been having you.  She has no known history of genital herpes or sexual partners who have had this that she knows of.  She states that her partner has not had any similar symptoms.  She denies fevers, chills, dysuria, had some slight pelvic pain initially when the sores broke out but this has completely resolved with 1 dose of ibuprofen.

## 2019-08-21 NOTE — ASSESSMENT & PLAN NOTE
She continues to take amlodipine 5 mg daily.  She reports good control of her blood pressures.  She has a home cuff and she occasionally checks.  Last night she was 115/64.  In clinic today she is 122/78.  She is tolerating the amlodipine without side effects.

## 2019-08-22 DIAGNOSIS — N89.8 VAGINAL DISCHARGE: ICD-10-CM

## 2019-08-22 LAB
C TRACH DNA SPEC QL NAA+PROBE: NEGATIVE
CANDIDA DNA VAG QL PROBE+SIG AMP: NEGATIVE
G VAGINALIS DNA VAG QL PROBE+SIG AMP: NEGATIVE
N GONORRHOEA DNA SPEC QL NAA+PROBE: NEGATIVE
SPECIMEN SOURCE: NORMAL
T VAGINALIS DNA VAG QL PROBE+SIG AMP: NEGATIVE

## 2019-08-23 ENCOUNTER — TELEPHONE (OUTPATIENT)
Dept: MEDICAL GROUP | Facility: MEDICAL CENTER | Age: 34
End: 2019-08-23

## 2019-08-24 LAB
HSV DNA SPEC QL NAA+PROBE: NOT DETECTED
SPECIMEN SOURCE: NORMAL

## 2021-12-15 ENCOUNTER — GYNECOLOGY VISIT (OUTPATIENT)
Dept: OBGYN | Facility: CLINIC | Age: 36
End: 2021-12-15
Payer: MEDICAID

## 2021-12-15 VITALS
HEIGHT: 64 IN | BODY MASS INDEX: 39.27 KG/M2 | WEIGHT: 230 LBS | DIASTOLIC BLOOD PRESSURE: 90 MMHG | SYSTOLIC BLOOD PRESSURE: 140 MMHG

## 2021-12-15 DIAGNOSIS — Z80.3 FAMILY HISTORY OF MALIGNANT NEOPLASM OF BREAST: ICD-10-CM

## 2021-12-15 DIAGNOSIS — Z34.90 PREGNANCY, UNSPECIFIED GESTATIONAL AGE: Primary | ICD-10-CM

## 2021-12-15 DIAGNOSIS — Z98.891 S/P C-SECTION: ICD-10-CM

## 2021-12-15 PROBLEM — N89.8 VAGINAL DISCHARGE: Status: RESOLVED | Noted: 2019-08-21 | Resolved: 2021-12-15

## 2021-12-15 PROBLEM — Z30.41 ENCOUNTER FOR SURVEILLANCE OF CONTRACEPTIVE PILLS: Status: RESOLVED | Noted: 2018-05-11 | Resolved: 2021-12-15

## 2021-12-15 PROCEDURE — 99203 OFFICE O/P NEW LOW 30 MIN: CPT | Mod: 25 | Performed by: OBSTETRICS & GYNECOLOGY

## 2021-12-15 PROCEDURE — 76830 TRANSVAGINAL US NON-OB: CPT | Performed by: OBSTETRICS & GYNECOLOGY

## 2021-12-15 NOTE — PROGRESS NOTES
"36 y.o.  Unknown By LMP Patient's last menstrual period was 10/30/2021 (lmp unknown).  here for confirmation of pregnancy. Pt has not gotten any PNC to date. She reports some cramping no VB, reports some nausea no Vomitting. This is a planned pregnancy. Pt is happy to continue this pregnancy. Pt is taking a PNV +DHA. She is overall well, without complaints. Last pap smear was 2017 Normal.     H/o HTN not on current meds.   OBhx:                 due to NRFHT (per op report arrest of dilation)    COVID vaccine: Yes #2, 21  Flu vaccine: No    I have reviewed the patients'  medical, obstetrical,social, and family histories, medications and available lab results.    No pain, bleeding, unusual discharge or leeking              Allergies: Amoxicillin and Percocet [oxycodone-acetaminophen]    Past Medical History:   Diagnosis Date   • Hypertension    • Irregular periods 2014     Past Surgical History:   Procedure Laterality Date   • DENTAL EXTRACTION(S)  3/19/2018    Procedure: DENTAL EXTRACTION(S);  Surgeon: Jim Dodson D.D.S.;  Location: SURGERY [facility];  Service: Dental   • SUBMANDIBLE ABSCESS INCISION AND DRAINAGE Left 3/19/2018    Procedure: Facial Incision and Drainage  extraction of teeth number 20;  Surgeon: Jim Dodson D.D.S.;  Location: SURGERY [facility];  Service: Dental   • PRIMARY C SECTION  2013    Performed by Lacie Parra M.D. at [facility]       Objective:Ht 1.626 m (5' 4\")   Wt 104 kg (230 lb)      HEENT:atraumatic  Abdomen:Soft, nontender, no hernias, masses, or organomegaly  Extremities:Normal  Pelvic: nml external genitalia     Ultrasound:  First trimester findings: Intrauterine gestational sac seen: yes, measuring about 6w1d. No yolk sac no fetal pole seen.     Assessment: 37 yo  @ approx 6 weeks based on today's US. GS only seen. Possible early gestation (unknonw LMP) vs blighted ovum/SAB    Plan:  -Pt encouraged to " continue PNV+DHA  -SAB precautions reviewed  -Pt to RTC in 1 week for repeat US and f/u symptoms  -All questions answered.

## 2021-12-15 NOTE — NON-PROVIDER
Patient is here today to :    *Establish care  *Confirmation of Pregnancy  *Approx LMP:  Around halloween 2021   *Approx EDC: 8/6/2022  * [facility]  .. Waiting of fax to come in  * Pt is unsure of 1st day of last period

## 2021-12-22 ENCOUNTER — GYNECOLOGY VISIT (OUTPATIENT)
Dept: OBGYN | Facility: CLINIC | Age: 36
End: 2021-12-22
Payer: MEDICAID

## 2021-12-22 ENCOUNTER — HOSPITAL ENCOUNTER (OUTPATIENT)
Dept: LAB | Facility: MEDICAL CENTER | Age: 36
End: 2021-12-22
Attending: OBSTETRICS & GYNECOLOGY
Payer: MEDICAID

## 2021-12-22 VITALS — SYSTOLIC BLOOD PRESSURE: 128 MMHG | WEIGHT: 229 LBS | DIASTOLIC BLOOD PRESSURE: 86 MMHG | BODY MASS INDEX: 39.31 KG/M2

## 2021-12-22 DIAGNOSIS — O02.0 BLIGHTED OVUM: ICD-10-CM

## 2021-12-22 LAB
BASOPHILS # BLD AUTO: 0.4 % (ref 0–1.8)
BASOPHILS # BLD: 0.04 K/UL (ref 0–0.12)
EOSINOPHIL # BLD AUTO: 0.21 K/UL (ref 0–0.51)
EOSINOPHIL NFR BLD: 2 % (ref 0–6.9)
ERYTHROCYTE [DISTWIDTH] IN BLOOD BY AUTOMATED COUNT: 44.9 FL (ref 35.9–50)
HCT VFR BLD AUTO: 40.1 % (ref 37–47)
HGB BLD-MCNC: 13.2 G/DL (ref 12–16)
IMM GRANULOCYTES # BLD AUTO: 0.03 K/UL (ref 0–0.11)
IMM GRANULOCYTES NFR BLD AUTO: 0.3 % (ref 0–0.9)
LYMPHOCYTES # BLD AUTO: 3.08 K/UL (ref 1–4.8)
LYMPHOCYTES NFR BLD: 29.9 % (ref 22–41)
MCH RBC QN AUTO: 29.8 PG (ref 27–33)
MCHC RBC AUTO-ENTMCNC: 32.9 G/DL (ref 33.6–35)
MCV RBC AUTO: 90.5 FL (ref 81.4–97.8)
MONOCYTES # BLD AUTO: 0.33 K/UL (ref 0–0.85)
MONOCYTES NFR BLD AUTO: 3.2 % (ref 0–13.4)
NEUTROPHILS # BLD AUTO: 6.61 K/UL (ref 2–7.15)
NEUTROPHILS NFR BLD: 64.2 % (ref 44–72)
NRBC # BLD AUTO: 0 K/UL
NRBC BLD-RTO: 0 /100 WBC
PLATELET # BLD AUTO: 251 K/UL (ref 164–446)
PMV BLD AUTO: 10.4 FL (ref 9–12.9)
RBC # BLD AUTO: 4.43 M/UL (ref 4.2–5.4)
WBC # BLD AUTO: 10.3 K/UL (ref 4.8–10.8)

## 2021-12-22 PROCEDURE — 99213 OFFICE O/P EST LOW 20 MIN: CPT | Mod: 25 | Performed by: OBSTETRICS & GYNECOLOGY

## 2021-12-22 PROCEDURE — 36415 COLL VENOUS BLD VENIPUNCTURE: CPT

## 2021-12-22 PROCEDURE — 76830 TRANSVAGINAL US NON-OB: CPT | Performed by: OBSTETRICS & GYNECOLOGY

## 2021-12-22 PROCEDURE — 85025 COMPLETE CBC W/AUTO DIFF WBC: CPT

## 2021-12-22 RX ORDER — MISOPROSTOL 200 UG/1
800 TABLET ORAL DAILY
Qty: 12 TABLET | Refills: 0 | Status: SHIPPED | OUTPATIENT
Start: 2021-12-22 | End: 2021-12-25

## 2021-12-22 NOTE — PATIENT INSTRUCTIONS
Blighted Ovum    A blighted ovum is a common kind of early pregnancy failure and a frequent cause of early pregnancy loss. It is often called a miscarriage. It happens when a fertilized egg attaches to the wall of the uterus but stops growing. Even though the egg never develops, the body acts like it is pregnant.  An early miscarriage can be difficult as you will experience the physical symptoms of the loss. You may also experience strong emotional symptoms that accompany the loss of a pregnancy.  What are the causes?  This condition is usually caused by a problem with the genes (genetic defect) in the egg.  What are the signs or symptoms?  Early symptoms of this condition are the same as those of a normal early pregnancy. They include:  · A missed menstrual period.  · Fatigue.  · Feeling sick to your stomach (nauseous).  · Sore breasts.  Later symptoms are those of pregnancy loss. They include:  · Abdominal cramps.  · Vaginal bleeding or spotting.  · A menstrual period that is heavier than usual.  How is this diagnosed?  This condition is usually diagnosed by a routine ultrasound. It can be confirmed with blood tests.  How is this treated?  This condition may be treated by:  · Waiting until your body naturally gets rid of the empty egg sac and placenta (miscarriage).  · Taking medicine to start a miscarriage. This medicine can be taken by mouth or placed into the vagina.  · Having a surgical procedure to remove the tissue. Your health care provider would open the cervix (dilation) and remove the tissue from your uterus (curettage).  Follow these instructions at home:  · Take over-the-counter and prescription medicines only as told by your health care provider.  · Talk with your health care provider about future pregnancies and pregnancy planning. Having this condition does not mean you will lose future pregnancies.  · To lower your risk of an infection, avoid the use of tampons or douches until your bleeding stops.  Also, avoid intercourse until your bleeding stops.  · You can return to your normal activities as soon as you feel well enough. Talk with your health care provider before resuming strenuous physical activity.  · After your miscarriage:  ? Rest at home for a few days.  ? You may have menstrual-like bleeding for a week or more, and you may have light bleeding for a couple weeks after that. Wear a pad until vaginal bleeding stops.  · Keep all follow-up visits as told by your health care provider. This is important.  Contact a health care provider if:  · You have a fever or chills.  · Your pain medicine is not helping.  · You have vaginal bleeding that continues for longer than expected.  · You continue to experience sadness after the loss of your pregnancy.  Get help right away if:  · You have severe abdominal pain.  · You feel dizzy or faint.  · You pass out.  · You have very heavy vaginal bleeding. A sign that vaginal bleeding is very heavy is if blood soaks through two large sanitary pads an hour for more than two hours.  Summary  · A blighted ovum is a common kind of early pregnancy loss also known as a miscarriage.  · Talk with your health care provider about treatment options that are best for you when experiencing a miscarriage.  · After your miscarriage, talk with your health care provider about future pregnancies or family planning needs.  · Follow your health care provider's instructions for recovery after your miscarriage.  This information is not intended to replace advice given to you by your health care provider. Make sure you discuss any questions you have with your health care provider.  Document Released: 04/03/2012 Document Revised: 05/08/2019 Document Reviewed: 05/08/2019  [employer] Patient Education © 2020 [employer]    Managing Pregnancy Loss  Pregnancy loss can happen any time during a pregnancy. Often the cause is not known. It is rarely because of anything you did. Pregnancy loss in early  pregnancy (during the first trimester) is called a miscarriage. This type of pregnancy loss is the most common. Pregnancy loss that happens after 20 weeks of pregnancy is called fetal demise if the baby's heart stops beating before birth. Fetal demise is much less common. Some women experience spontaneous labor shortly after fetal demise resulting in a stillborn birth (stillbirth).  Any pregnancy loss can be devastating. You will need to recover both physically and emotionally. Most women are able to get pregnant again after a pregnancy loss and deliver a healthy baby.  How to manage emotional recovery    Pregnancy loss is very hard emotionally. You may feel many different emotions while you grieve. You may feel sad and angry. You may also feel guilty. It is normal to have periods of crying. Emotional recovery can take longer than physical recovery. It is different for everyone.  Taking these steps can help you in managing this loss:  · Remember that it is unlikely you did anything to cause the pregnancy loss.  · Share your thoughts and feelings with friends, family, and your partner. Remember that your partner is also recovering emotionally.  · Make sure you have a good support system. Do not spend too much time alone.  · Meet with a pregnancy loss counselor or join a pregnancy loss support group.  · Get enough sleep and eat a healthy diet. Return to regular exercise when you have recovered physically.  · Do not use drugs or alcohol to manage your emotions.  · Consider seeing a mental health professional to help you recover emotionally.  · Ask a friend or loved one to help you decide what to do with any clothing and nursery items you received for your baby.  In the case of a stillbirth, many women benefit from taking additional steps in the grieving process. You may want to:  · Hold your baby after the birth.  · Name your baby.  · Request a birth certificate.  · Create a keepsake such as handprints or  footprints.  · Dress your baby and have a picture taken.  · Make  arrangements.  · Ask for a Nondenominational or blessing.  Hospitals have staff members who can help you with all these arrangements.  How to recognize emotional stress  It is normal to have emotional stress after a pregnancy loss. But emotional stress that lasts a long time or becomes severe requires treatment. Watch out for these signs of severe emotional stress:  · Sadness, anger, or guilt that is not going away and is interfering with your normal activities.  · Relationship problems that have occurred or gotten worse since the pregnancy loss.  · Signs of depression that last longer than 2 weeks. These may include:  ? Sadness.  ? Anxiety.  ? Hopelessness.  ? Loss of interest in activities you enjoy.  ? Inability to concentrate.  ? Trouble sleeping or sleeping too much.  ? Loss of appetite or overeating.  ? Thoughts of death or of hurting yourself.  Follow these instructions at home:  · Take over-the-counter and prescription medicines only as told by your health care provider.  · Rest at home until your energy level returns. Return to your normal activities as told by your health care provider. Ask your health care provider what activities are safe for you.  · When you are ready, meet with your health care provider to discuss steps to take for a future pregnancy.  · Keep all follow-up visits as told by your health care provider. This is important.  Where to find support  · To help you and your partner with the process of grieving, talk with your health care provider or seek counseling.  · Consider meeting with others who have experienced pregnancy loss. Ask your health care provider about support groups and resources.  Where to find more information  · [place] Department of Health and Human Services Office on Women's Health: www.womenshealth.gov  · American Pregnancy Association: www.americanpregnancy.org  Contact a health care provider if:  · You  continue to experience grief, sadness, or lack of motivation for everyday activities, and those feelings do not improve over time.  · You are struggling to recover emotionally, especially if you are using alcohol or substances to help.  Get help right away if:  · You have thoughts of hurting yourself or others.  If you ever feel like you may hurt yourself or others, or have thoughts about taking your own life, get help right away. You can go to your nearest emergency department or call:  · Your local emergency services (911 in the [place]  · A suicide crisis helpline, such as the National Suicide Prevention Lifeline at 1-913.477.7534. This is open 24 hours a day.  Summary  · Any pregnancy loss can be difficult physically and emotionally.  · You may experience many different emotions while you grieve. Emotional recovery can last longer than physical recovery.  · It is normal to have emotional stress after a pregnancy loss. But emotional stress that lasts a long time or becomes severe requires treatment.  · See your health care provider if you are struggling emotionally after a pregnancy loss.  This information is not intended to replace advice given to you by your health care provider. Make sure you discuss any questions you have with your health care provider.  Document Released: 02/28/2019 Document Revised: 04/08/2020 Document Reviewed: 02/28/2019  [employer] Patient Education © 2020 [employer].    Miscarriage  A miscarriage is the loss of an unborn baby (fetus) before the 20th week of pregnancy. Most miscarriages happen during the first 3 months of pregnancy. Sometimes, a miscarriage can happen before a woman knows that she is pregnant.  Having a miscarriage can be an emotional experience. If you have had a miscarriage, talk with your health care provider about any questions you may have about miscarrying, the grieving process, and your plans for future pregnancy.  What are the causes?  A miscarriage may be caused  by:  · Problems with the genes or chromosomes of the fetus. These problems make it impossible for the baby to develop normally. They are often the result of random errors that occur early in the development of the baby, and are not passed from parent to child (not inherited).  · Infection of the cervix or uterus.  · Conditions that affect hormone balance in the body.  · Problems with the cervix, such as the cervix opening and thinning before pregnancy is at term (cervical insufficiency).  · Problems with the uterus. These may include:  ? A uterus with an abnormal shape.  ? Fibroids in the uterus.  ? Congenital abnormalities. These are problems that were present at birth.  · Certain medical conditions.  · Smoking, drinking alcohol, or using drugs.  · Injury (trauma).  In many cases, the cause of a miscarriage is not known.  What are the signs or symptoms?  Symptoms of this condition include:  · Vaginal bleeding or spotting, with or without cramps or pain.  · Pain or cramping in the abdomen or lower back.  · Passing fluid, tissue, or blood clots from the vagina.  How is this diagnosed?  This condition may be diagnosed based on:  · A physical exam.  · Ultrasound.  · Blood tests.  · Urine tests.  How is this treated?  Treatment for a miscarriage is sometimes not necessary if you naturally pass all the tissue that was in your uterus. If necessary, this condition may be treated with:  · Dilation and curettage (D&C). This is a procedure in which the cervix is stretched open and the lining of the uterus (endometrium) is scraped. This is done only if tissue from the fetus or placenta remains in the body (incomplete miscarriage).  · Medicines, such as:  ? Antibiotic medicine, to treat infection.  ? Medicine to help the body pass any remaining tissue.  ? Medicine to reduce (contract) the size of the uterus. These medicines may be given if you have a lot of bleeding.  If you have Rh negative blood and your baby was Rh positive,  "you will need a shot of a medicine called Rh immunoglobulinto protect your future babies from Rh blood problems. \"Rh-negative\" and \"Rh-positive\" refer to whether or not the blood has a specific protein found on the surface of red blood cells (Rh factor).  Follow these instructions at home:  Medicines    · Take over-the-counter and prescription medicines only as told by your health care provider.  · If you were prescribed antibiotic medicine, take it as told by your health care provider. Do not stop taking the antibiotic even if you start to feel better.  · Do not take NSAIDs, such as aspirin and ibuprofen, unless they are approved by your health care provider. These medicines can cause bleeding.  Activity  · Rest as directed. Ask your health care provider what activities are safe for you.  · Have someone help with home and family responsibilities during this time.  General instructions  · Keep track of the number of sanitary pads you use each day and how soaked (saturated) they are. Write down this information.  · Monitor the amount of tissue or blood clots that you pass from your vagina. Save any large amounts of tissue for your health care provider to examine.  · Do not use tampons, douche, or have sex until your health care provider approves.  · To help you and your partner with the process of grieving, talk with your health care provider or seek counseling.  · When you are ready, meet with your health care provider to discuss any important steps you should take for your health. Also, discuss steps you should take to have a healthy pregnancy in the future.  · Keep all follow-up visits as told by your health care provider. This is important.  Where to find more information  · The American Congress of Obstetricians and Gynecologists: www.acog.org  · [place]. Department of Health and Human Services Office of Women’s Health: www.womenshealth.gov  Contact a health care provider if:  · You have a fever or chills.  · You " have a foul smelling vaginal discharge.  · You have more bleeding instead of less.  Get help right away if:  · You have severe cramps or pain in your back or abdomen.  · You pass blood clots or tissue from your vagina that is walnut-sized or larger.  · You soak more than 1 regular sanitary pad in an hour.  · You become light-headed or weak.  · You pass out.  · You have feelings of sadness that take over your thoughts, or you have thoughts of hurting yourself.  Summary  · Most miscarriages happen in the first 3 months of pregnancy. Sometimes miscarriage happens before a woman even knows that she is pregnant.  · Follow your health care provider's instruction for home care. Keep all follow-up appointments.  · To help you and your partner with the process of grieving, talk with your health care provider or seek counseling.  This information is not intended to replace advice given to you by your health care provider. Make sure you discuss any questions you have with your health care provider.  Document Released: 06/13/2002 Document Revised: 04/10/2020 Document Reviewed: 01/23/2018  Elsevier Patient Education © 2020 [employer] Inc.

## 2021-12-22 NOTE — PROGRESS NOTES
Gynecologic Exam (DUB)         History of present illness: 36 y.o.  presents to office for repeat TVUS to assess viability of pregnancy. Pt has had more nausea and felt like vomiting twice but never did. She has had some cramping but no bleeding.       Review of systems:  Pertinent positives documented in HPI and a comprehensive review of system is negative    All PMH, PSH, allergies, social history and FH reviewed and updated today:    Physical exam:   Vitals:    21 0935   BP: 128/86         GENERAL APPEARANCE: healthy, alert  FEMALE GYN: normal female external genitalia without lesions,   NEURO Awake, alert and oriented x 3, Normal gait, no sensory deficits  SKIN No rashes, or ulcers or lesions seen  PSYCHIATRIC: Patient shows appropriate affect, is alert and oriented x3, intact judgment and insight.      Assessment: 35 yo  her with blighted ovum  1. Blighted ovum  CBC WITH DIFFERENTIAL       Plan:  -We discussed the causes of SAB and the good likelihood of a normal pregnancy in the future. Condolences given  -We reviewed normal symptoms that can occur over the next few weeks (AUB and crmaping).   -Discussed medical and surgical options of care  -Pt at this time would like to proceed with expectant management.  -We discussed me sending a Rx should she change her mind and want medical treatment. Pt agrees.   -RX sent electronically after discussion of side effects of medication with risks and benefits.   -Pt asked to complete CBC to ensure no anemia. Blood Type is O+  -All questions answered  -PT to RTC in 1-2 weeks for f/u. Pt agrees with plan    Spent  25 minutes in face-to-face patient contact in which greater than 50% of that visit was spent in counseling/coordination of care including medical and surgical options of care.

## 2022-01-13 ENCOUNTER — TELEPHONE (OUTPATIENT)
Dept: OBGYN | Facility: CLINIC | Age: 37
End: 2022-01-13

## 2022-01-13 NOTE — TELEPHONE ENCOUNTER
Pt was supposed to see my today for f/u after dx for blighted ovum. She canceled today's appt as she is having some congestion and cold like symptoms.    She has had scant brownish spotting, finally took then misoprostol started 3 days ago. She did have a lot of bleeding and cramping. She believes she passed something this am. Since then, her bleeding has changed to light pink and she is having less cramping.    Pt encouraged to take home covid test and seek medical care as needed. Pt agrees.    As long as pt is healthy will plant o see her next week in office and confirm completed AB.

## 2022-02-03 DIAGNOSIS — O02.0 BLIGHTED OVUM: ICD-10-CM

## 2022-02-03 NOTE — PROGRESS NOTES
Pt had a blighted ovum in 12/21. She took cytotec in Jan and had bleeding and cramping. Pt was to f/u but then got covid. She has had to be rescheduled a few times. To ensure completed SAB will order TVUS and have pt take a home UPT. If these are both neg will then will be satified that no further treatment is needed.     Called pt and discussed the plan. Pt agrees. Will get TVUS and home UPT done asap. Pt can RTC as needed.

## 2022-10-26 ENCOUNTER — OFFICE VISIT (OUTPATIENT)
Dept: MEDICAL GROUP | Facility: MEDICAL CENTER | Age: 37
End: 2022-10-26
Attending: INTERNAL MEDICINE
Payer: MEDICAID

## 2022-10-26 VITALS
TEMPERATURE: 97.5 F | BODY MASS INDEX: 41.29 KG/M2 | SYSTOLIC BLOOD PRESSURE: 130 MMHG | DIASTOLIC BLOOD PRESSURE: 90 MMHG | HEART RATE: 92 BPM | HEIGHT: 63 IN | WEIGHT: 233 LBS | RESPIRATION RATE: 16 BRPM

## 2022-10-26 DIAGNOSIS — Z23 NEED FOR VACCINATION: ICD-10-CM

## 2022-10-26 DIAGNOSIS — E66.01 MORBID OBESITY WITH BMI OF 40.0-44.9, ADULT (HCC): ICD-10-CM

## 2022-10-26 DIAGNOSIS — I10 ESSENTIAL HYPERTENSION: ICD-10-CM

## 2022-10-26 DIAGNOSIS — Z80.3 FAMILY HISTORY OF MALIGNANT NEOPLASM OF BREAST: ICD-10-CM

## 2022-10-26 PROBLEM — E66.9 OBESITY (BMI 35.0-39.9 WITHOUT COMORBIDITY): Status: RESOLVED | Noted: 2019-08-21 | Resolved: 2022-10-26

## 2022-10-26 PROBLEM — N89.8 VAGINAL LESION: Status: RESOLVED | Noted: 2019-08-21 | Resolved: 2022-10-26

## 2022-10-26 PROCEDURE — 90471 IMMUNIZATION ADMIN: CPT

## 2022-10-26 PROCEDURE — 99214 OFFICE O/P EST MOD 30 MIN: CPT | Performed by: INTERNAL MEDICINE

## 2022-10-26 PROCEDURE — 99213 OFFICE O/P EST LOW 20 MIN: CPT | Mod: 25 | Performed by: INTERNAL MEDICINE

## 2022-10-26 RX ORDER — AMLODIPINE BESYLATE 5 MG/1
5 TABLET ORAL DAILY
Qty: 30 TABLET | Refills: 11 | Status: SHIPPED | OUTPATIENT
Start: 2022-10-26

## 2022-10-26 RX ORDER — LOSARTAN POTASSIUM 50 MG/1
50 TABLET ORAL DAILY
Qty: 30 TABLET | Refills: 3 | Status: SHIPPED | OUTPATIENT
Start: 2022-10-26 | End: 2022-11-09 | Stop reason: SDUPTHER

## 2022-10-26 ASSESSMENT — PATIENT HEALTH QUESTIONNAIRE - PHQ9: CLINICAL INTERPRETATION OF PHQ2 SCORE: 0

## 2022-10-26 NOTE — ASSESSMENT & PLAN NOTE
She presents today for ER follow-up.  She was recently seen in the emergency department at [facility] for hypertension with headache.  States that she had a blood pressure of 180/140.  They restarted her amlodipine which she had been off of for about 2 years.  She has been checking her blood pressure using her home wrist cuff since then.  Majority of her readings are in the 140s over 90s.  She has had a few readings in the 130s over 90s.  Has been taking her amlodipine in the evenings.

## 2022-10-27 NOTE — PROGRESS NOTES
Subjective:   Valerie Ruggiero is a 36 y.o. female here today for hospital discharge follow-up    Essential hypertension  She presents today for ER follow-up.  She was recently seen in the emergency department at [facility] for hypertension with headache.  States that she had a blood pressure of 180/140.  They restarted her amlodipine which she had been off of for about 2 years.  She has been checking her blood pressure using her home wrist cuff since then.  Majority of her readings are in the 140s over 90s.  She has had a few readings in the 130s over 90s.  Has been taking her amlodipine in the evenings.    Family history of malignant neoplasm of breast  Has a family history of breast cancer in her maternal and paternal grandparents as well as her maternal aunt.  She is flagged in our system as at risk for breast cancer.  We discussed referral to genetic counselor.  She would like to think about this and she will let me know at her next visit what she wants to do.       Current medicines (including changes today)  Current Outpatient Medications   Medication Sig Dispense Refill    amLODIPine (NORVASC) 5 MG Tab Take 1 Tablet by mouth every day. 30 Tablet 11    losartan (COZAAR) 50 MG Tab Take 1 Tablet by mouth every day. 30 Tablet 3     No current facility-administered medications for this visit.     She  has a past medical history of Hypertension and Irregular periods (11/25/2014).         Objective:     Vitals:    10/26/22 1541   BP: (!) 130/90   Pulse: 92   Resp: 16   Temp: 36.4 °C (97.5 °F)     Body mass index is 41.28 kg/m².   Physical Exam:  Constitutional: Alert, no distress.  Skin: Warm, dry, good turgor, no rashes in visible areas.  Eye: Equal, round and reactive, conjunctiva clear, lids normal.  Respiratory: Unlabored respiratory effort, lungs clear to auscultation, no wheezes, no ronchi.  Cardiovascular: Regular rate and rhythm, no murmurs appreciated, no lower extremity edema  Psych: Alert and  oriented x3, normal affect and mood.      Assessment and Plan:   The following treatment plan was discussed    1. Essential hypertension  Blood pressure still remains elevated.  We discussed adding losartan to her regimen.  She will follow-up with me in 2 to 4 weeks with home blood pressure readings.  Also discussed reducing sodium in diet and regular exercise to help lower blood pressure  - amLODIPine (NORVASC) 5 MG Tab; Take 1 Tablet by mouth every day.  Dispense: 30 Tablet; Refill: 11  - losartan (COZAAR) 50 MG Tab; Take 1 Tablet by mouth every day.  Dispense: 30 Tablet; Refill: 3    2. Need for vaccination  - INFLUENZA VACCINE QUAD INJ (PF)    3. Family history of malignant neoplasm of breast  She is not sure if she would like to do genetic counseling at this time.  I will revisit this at her next visit.    4. Morbid obesity with BMI of 40.0-44.9, adult (HCC)  - Patient identified as having weight management issue.  Appropriate orders and counseling given.        Followup: Return in about 2 weeks (around 11/9/2022) for hypertension.

## 2022-10-27 NOTE — ASSESSMENT & PLAN NOTE
Has a family history of breast cancer in her maternal and paternal grandparents as well as her maternal aunt.  She is flagged in our system as at risk for breast cancer.  We discussed referral to genetic counselor.  She would like to think about this and she will let me know at her next visit what she wants to do.

## 2022-11-09 ENCOUNTER — OFFICE VISIT (OUTPATIENT)
Dept: MEDICAL GROUP | Facility: MEDICAL CENTER | Age: 37
End: 2022-11-09
Attending: INTERNAL MEDICINE
Payer: MEDICAID

## 2022-11-09 VITALS
OXYGEN SATURATION: 96 % | BODY MASS INDEX: 41.82 KG/M2 | SYSTOLIC BLOOD PRESSURE: 122 MMHG | DIASTOLIC BLOOD PRESSURE: 88 MMHG | TEMPERATURE: 98.1 F | HEIGHT: 63 IN | WEIGHT: 236 LBS | HEART RATE: 100 BPM | RESPIRATION RATE: 16 BRPM

## 2022-11-09 DIAGNOSIS — I10 ESSENTIAL HYPERTENSION: ICD-10-CM

## 2022-11-09 DIAGNOSIS — E66.01 MORBID OBESITY WITH BMI OF 40.0-44.9, ADULT (HCC): ICD-10-CM

## 2022-11-09 DIAGNOSIS — Z80.3 FAMILY HISTORY OF MALIGNANT NEOPLASM OF BREAST: ICD-10-CM

## 2022-11-09 PROCEDURE — 99214 OFFICE O/P EST MOD 30 MIN: CPT | Performed by: INTERNAL MEDICINE

## 2022-11-09 PROCEDURE — 99212 OFFICE O/P EST SF 10 MIN: CPT | Performed by: INTERNAL MEDICINE

## 2022-11-09 RX ORDER — LOSARTAN POTASSIUM 50 MG/1
50 TABLET ORAL DAILY
Qty: 30 TABLET | Refills: 11 | Status: SHIPPED | OUTPATIENT
Start: 2022-11-09

## 2022-11-10 NOTE — PROGRESS NOTES
Subjective:   Valerie Ruggiero is a 36 y.o. female here today for follow-up hypertension    Essential hypertension  She presents today for follow-up hypertension.  At her last visit we added losartan 50 mg daily to her amlodipine.  She reports taking both at night and tolerating well without side effects.  States she has been checking blood pressure intermittently at home and its typically in the 120s over 80s although she forgot to bring her log today.    Family history of malignant neoplasm of breast  We discussed genetic testing which she declines at this time.     Current medicines (including changes today)  Current Outpatient Medications   Medication Sig Dispense Refill    losartan (COZAAR) 50 MG Tab Take 1 Tablet by mouth every day. 30 Tablet 11    amLODIPine (NORVASC) 5 MG Tab Take 1 Tablet by mouth every day. 30 Tablet 11     No current facility-administered medications for this visit.     She  has a past medical history of Hypertension and Irregular periods (11/25/2014).         Objective:     Vitals:    11/09/22 1507   BP: 122/88   Pulse: 100   Resp: 16   Temp: 36.7 °C (98.1 °F)   SpO2: 96%     Body mass index is 41.82 kg/m².   Physical Exam:  Constitutional: Alert, no distress.  Skin: Warm, dry, good turgor, no rashes in visible areas.  Eye: Equal, round and reactive, conjunctiva clear, lids  Psych: Alert and oriented x3, normal affect and mood.    Assessment and Plan:   The following treatment plan was discussed    1. Essential hypertension  Stable and controlled with current meds.  We will obtain updated labs.  -Amlodipine 5 mg daily  - losartan (COZAAR) 50 MG Tab; Take 1 Tablet by mouth every day.  Dispense: 30 Tablet; Refill: 11  - Comp Metabolic Panel; Future    2. Morbid obesity with BMI of 40.0-44.9, adult (HCC)  - HEMOGLOBIN A1C; Future  - Lipid Profile; Future    3. Family history of malignant neoplasm of breast  She declines referral to genetic testing at this time.  We discussed that if  she changes her mind or would like to have this in the future she should let me know.        Followup: Return in about 4 weeks (around 12/7/2022) for PAP.

## 2022-11-10 NOTE — ASSESSMENT & PLAN NOTE
She presents today for follow-up hypertension.  At her last visit we added losartan 50 mg daily to her amlodipine.  She reports taking both at night and tolerating well without side effects.  States she has been checking blood pressure intermittently at home and its typically in the 120s over 80s although she forgot to bring her log today.

## 2022-12-14 ENCOUNTER — HOSPITAL ENCOUNTER (OUTPATIENT)
Dept: LAB | Facility: MEDICAL CENTER | Age: 37
End: 2022-12-14
Attending: INTERNAL MEDICINE
Payer: MEDICAID

## 2022-12-14 DIAGNOSIS — I10 ESSENTIAL HYPERTENSION: ICD-10-CM

## 2022-12-14 DIAGNOSIS — E66.01 MORBID OBESITY WITH BMI OF 40.0-44.9, ADULT (HCC): ICD-10-CM

## 2022-12-14 LAB
ALBUMIN SERPL BCP-MCNC: 3.9 G/DL (ref 3.2–4.9)
ALBUMIN/GLOB SERPL: 1.3 G/DL
ALP SERPL-CCNC: 113 U/L (ref 30–99)
ALT SERPL-CCNC: 22 U/L (ref 2–50)
ANION GAP SERPL CALC-SCNC: 9 MMOL/L (ref 7–16)
AST SERPL-CCNC: 16 U/L (ref 12–45)
BILIRUB SERPL-MCNC: 0.4 MG/DL (ref 0.1–1.5)
BUN SERPL-MCNC: 8 MG/DL (ref 8–22)
CALCIUM ALBUM COR SERPL-MCNC: 9.2 MG/DL (ref 8.5–10.5)
CALCIUM SERPL-MCNC: 9.1 MG/DL (ref 8.5–10.5)
CHLORIDE SERPL-SCNC: 101 MMOL/L (ref 96–112)
CHOLEST SERPL-MCNC: 210 MG/DL (ref 100–199)
CO2 SERPL-SCNC: 26 MMOL/L (ref 20–33)
CREAT SERPL-MCNC: 0.68 MG/DL (ref 0.5–1.4)
EST. AVERAGE GLUCOSE BLD GHB EST-MCNC: 120 MG/DL
GFR SERPLBLD CREATININE-BSD FMLA CKD-EPI: 116 ML/MIN/1.73 M 2
GLOBULIN SER CALC-MCNC: 3.1 G/DL (ref 1.9–3.5)
GLUCOSE SERPL-MCNC: 94 MG/DL (ref 65–99)
HBA1C MFR BLD: 5.8 % (ref 4–5.6)
HDLC SERPL-MCNC: 65 MG/DL
LDLC SERPL CALC-MCNC: 127 MG/DL
POTASSIUM SERPL-SCNC: 4.2 MMOL/L (ref 3.6–5.5)
PROT SERPL-MCNC: 7 G/DL (ref 6–8.2)
SODIUM SERPL-SCNC: 136 MMOL/L (ref 135–145)
TRIGL SERPL-MCNC: 90 MG/DL (ref 0–149)

## 2022-12-14 PROCEDURE — 80061 LIPID PANEL: CPT

## 2022-12-14 PROCEDURE — 83036 HEMOGLOBIN GLYCOSYLATED A1C: CPT

## 2022-12-14 PROCEDURE — 36415 COLL VENOUS BLD VENIPUNCTURE: CPT

## 2022-12-14 PROCEDURE — 80053 COMPREHEN METABOLIC PANEL: CPT

## 2022-12-16 ENCOUNTER — HOSPITAL ENCOUNTER (OUTPATIENT)
Facility: MEDICAL CENTER | Age: 37
End: 2022-12-16
Attending: INTERNAL MEDICINE
Payer: MEDICAID

## 2022-12-16 ENCOUNTER — OFFICE VISIT (OUTPATIENT)
Dept: MEDICAL GROUP | Facility: MEDICAL CENTER | Age: 37
End: 2022-12-16
Attending: INTERNAL MEDICINE
Payer: MEDICAID

## 2022-12-16 VITALS
TEMPERATURE: 97.9 F | HEART RATE: 100 BPM | OXYGEN SATURATION: 96 % | BODY MASS INDEX: 41.82 KG/M2 | SYSTOLIC BLOOD PRESSURE: 128 MMHG | RESPIRATION RATE: 16 BRPM | DIASTOLIC BLOOD PRESSURE: 88 MMHG | WEIGHT: 236 LBS | HEIGHT: 63 IN

## 2022-12-16 DIAGNOSIS — R74.8 ELEVATED ALKALINE PHOSPHATASE LEVEL: ICD-10-CM

## 2022-12-16 DIAGNOSIS — R73.03 PREDIABETES: ICD-10-CM

## 2022-12-16 DIAGNOSIS — E78.5 HYPERLIPIDEMIA, UNSPECIFIED HYPERLIPIDEMIA TYPE: ICD-10-CM

## 2022-12-16 DIAGNOSIS — I10 ESSENTIAL HYPERTENSION: ICD-10-CM

## 2022-12-16 DIAGNOSIS — Z12.4 SCREENING FOR MALIGNANT NEOPLASM OF CERVIX: ICD-10-CM

## 2022-12-16 PROCEDURE — 87624 HPV HI-RISK TYP POOLED RSLT: CPT

## 2022-12-16 PROCEDURE — 99214 OFFICE O/P EST MOD 30 MIN: CPT | Mod: 25 | Performed by: INTERNAL MEDICINE

## 2022-12-16 PROCEDURE — 88175 CYTOPATH C/V AUTO FLUID REDO: CPT

## 2022-12-16 PROCEDURE — 99213 OFFICE O/P EST LOW 20 MIN: CPT | Mod: 25 | Performed by: INTERNAL MEDICINE

## 2022-12-16 ASSESSMENT — FIBROSIS 4 INDEX: FIB4 SCORE: 0.49

## 2022-12-16 NOTE — ASSESSMENT & PLAN NOTE
She reports blood pressures have been well controlled at home.  She continues on losartan 50 mg daily and amlodipine 5 mg daily.

## 2022-12-16 NOTE — ASSESSMENT & PLAN NOTE
Presents today for Pap.  Last was done in 2017 and was normal.  She is sexually active with 1 male partner.  She denies dyspareunia, dysuria, vaginal discharge, abnormal vaginal bleeding.

## 2022-12-17 NOTE — PROGRESS NOTES
Subjective:   Valerie Ruggiero is a 37 y.o. female here today for PAP, lab review    Essential hypertension  She reports blood pressures have been well controlled at home.  She continues on losartan 50 mg daily and amlodipine 5 mg daily.    Screening for malignant neoplasm of cervix  Presents today for Pap.  Last was done in 2017 and was normal.  She is sexually active with 1 male partner.  She denies dyspareunia, dysuria, vaginal discharge, abnormal vaginal bleeding.    Prediabetes  Most recent labs show mildly elevated A1c of 5.8.    Hyperlipidemia  Most recent labs show mildly elevated LDL at 127.       Current medicines (including changes today)  Current Outpatient Medications   Medication Sig Dispense Refill    losartan (COZAAR) 50 MG Tab Take 1 Tablet by mouth every day. 30 Tablet 11    amLODIPine (NORVASC) 5 MG Tab Take 1 Tablet by mouth every day. 30 Tablet 11     No current facility-administered medications for this visit.     She  has a past medical history of Hypertension and Irregular periods (11/25/2014).         Objective:     Vitals:    12/16/22 1401   BP: 128/88   Pulse: 100   Resp: 16   Temp: 36.6 °C (97.9 °F)   SpO2: 96%     Body mass index is 41.82 kg/m².   Physical Exam:  Constitutional: Alert, no distress.  Skin: Warm, dry, good turgor, no rashes in visible areas.  Eye: Equal, round and reactive, conjunctiva clear, lids normal.  : external genitalia normal, cervix without discharge or lesions, no cervical motion tenderness      Results and Imaging:   Hospital Outpatient Visit on 12/14/2022   Component Date Value Ref Range Status    Sodium 12/14/2022 136  135 - 145 mmol/L Final    Potassium 12/14/2022 4.2  3.6 - 5.5 mmol/L Final    Chloride 12/14/2022 101  96 - 112 mmol/L Final    Co2 12/14/2022 26  20 - 33 mmol/L Final    Anion Gap 12/14/2022 9.0  7.0 - 16.0 Final    Glucose 12/14/2022 94  65 - 99 mg/dL Final    Bun 12/14/2022 8  8 - 22 mg/dL Final    Creatinine 12/14/2022 0.68  0.50 -  1.40 mg/dL Final    Calcium 12/14/2022 9.1  8.5 - 10.5 mg/dL Final    AST(SGOT) 12/14/2022 16  12 - 45 U/L Final    ALT(SGPT) 12/14/2022 22  2 - 50 U/L Final    Alkaline Phosphatase 12/14/2022 113 (H)  30 - 99 U/L Final    Total Bilirubin 12/14/2022 0.4  0.1 - 1.5 mg/dL Final    Albumin 12/14/2022 3.9  3.2 - 4.9 g/dL Final    Total Protein 12/14/2022 7.0  6.0 - 8.2 g/dL Final    Globulin 12/14/2022 3.1  1.9 - 3.5 g/dL Final    A-G Ratio 12/14/2022 1.3  g/dL Final    Cholesterol,Tot 12/14/2022 210 (H)  100 - 199 mg/dL Final    Triglycerides 12/14/2022 90  0 - 149 mg/dL Final    HDL 12/14/2022 65  >=40 mg/dL Final    LDL 12/14/2022 127 (H)  <100 mg/dL Final    Glycohemoglobin 12/14/2022 5.8 (H)  4.0 - 5.6 % Final    Comment: Increased risk for diabetes:  5.7 -6.4%  Diabetes:  >6.4%  Glycemic control for adults with diabetes:  <7.0%    The above interpretations are per ADA guidelines.  Diagnosis  of diabetes mellitus on the basis of elevated Hemoglobin A1c  should be confirmed by repeating the Hb A1c test.      Est Avg Glucose 12/14/2022 120  mg/dL Final    Comment: The eAG calculation is based on the A1c-Derived Daily Glucose  (ADAG) study.  See the ADA's website for additional information.      Correct Calcium 12/14/2022 9.2  8.5 - 10.5 mg/dL Final    GFR (CKD-EPI) 12/14/2022 116  >60 mL/min/1.73 m 2 Final    Comment: Estimated Glomerular Filtration Rate is calculated using  race neutral CKD-EPI 2021 equation per NKF-ASN recommendations.           Assessment and Plan:   The following treatment plan was discussed    1. Hyperlipidemia, unspecified hyperlipidemia type  Counseled on lifestyle modifications.  We will repeat labs in 5 to 6 months  - Lipid Profile; Future    2. Prediabetes  Counseled on lifestyle modifications.  We will repeat labs in 5 to 6 months  - HEMOGLOBIN A1C; Future    3. Elevated alkaline phosphatase level  We will repeat labs to ensure elevated at her next blood draw.  - Comp Metabolic Panel;  Future    4. Screening for malignant neoplasm of cervix  - THINPREP PAP WITH HPV; Future    5. Essential hypertension  Stable and controlled with current medication.  -Continue losartan 50 mg daily, amlodipine 5 mg daily        Followup: Return in about 6 months (around 6/16/2023) for hypertension.

## 2022-12-19 LAB
CYTOLOGY REG CYTOL: NORMAL
HPV HR 12 DNA CVX QL NAA+PROBE: NEGATIVE
HPV16 DNA SPEC QL NAA+PROBE: NEGATIVE
HPV18 DNA SPEC QL NAA+PROBE: NEGATIVE
SPECIMEN SOURCE: NORMAL

## 2023-01-22 NOTE — TELEPHONE ENCOUNTER
Was the patient seen in the last year in this department? Yes    Does patient have an active prescription for medications requested? Yes    Received Request Via: Pharmacy       Pt Is attempting to get the first available appt to see any provider, Pt is  Low on  Medication. Pt also stated she will be getting different health insurance on the 1st of April.    no

## 2024-04-27 ENCOUNTER — OFFICE VISIT (OUTPATIENT)
Dept: URGENT CARE | Facility: PHYSICIAN GROUP | Age: 39
End: 2024-04-27
Payer: COMMERCIAL

## 2024-04-27 ENCOUNTER — HOSPITAL ENCOUNTER (OUTPATIENT)
Dept: RADIOLOGY | Facility: MEDICAL CENTER | Age: 39
End: 2024-04-27
Attending: PHYSICIAN ASSISTANT
Payer: COMMERCIAL

## 2024-04-27 VITALS
TEMPERATURE: 97.8 F | WEIGHT: 245 LBS | BODY MASS INDEX: 43.41 KG/M2 | HEART RATE: 97 BPM | HEIGHT: 63 IN | OXYGEN SATURATION: 98 % | SYSTOLIC BLOOD PRESSURE: 166 MMHG | DIASTOLIC BLOOD PRESSURE: 98 MMHG | RESPIRATION RATE: 18 BRPM

## 2024-04-27 DIAGNOSIS — S99.912A LEFT ANKLE INJURY, INITIAL ENCOUNTER: ICD-10-CM

## 2024-04-27 DIAGNOSIS — S93.402A SPRAIN OF LEFT ANKLE, UNSPECIFIED LIGAMENT, INITIAL ENCOUNTER: Primary | ICD-10-CM

## 2024-04-27 PROCEDURE — 73610 X-RAY EXAM OF ANKLE: CPT | Mod: LT

## 2024-04-27 ASSESSMENT — ENCOUNTER SYMPTOMS
TINGLING: 1
NUMBNESS: 1

## 2024-04-27 NOTE — PROGRESS NOTES
"Subjective     Valerie Ruggiero is a 38 y.o. female who presents with Ankle Injury (Pt states twisted ankle when going off a step ladder X 4-14-24. Left ankle appears swollen, injured area is described as \"sleep feeling\".)            Patient presents with:  Ankle Injury: Pt states twisted ankle when going off a step ladder 2 weeks ago.   Left ankle appears swollen, injured area is described as \"sleep feeling\" at times.  Pt can ambulate with pain, no previous injury to foot/ankle.  No other complaints.         Ankle Injury   The incident occurred more than 1 week ago. The incident occurred at home. The injury mechanism was a twisting injury and a fall. The pain is present in the left ankle. The quality of the pain is described as aching. The pain is mild. The pain has been Constant since onset. Associated symptoms include an inability to bear weight, numbness and tingling. She reports no foreign bodies present. The symptoms are aggravated by weight bearing, palpation and movement. She has tried rest and ice for the symptoms. The treatment provided mild relief.       Review of Systems   Musculoskeletal:  Positive for joint pain (left ankle).   Neurological:  Positive for tingling and numbness.   All other systems reviewed and are negative.             Objective     BP (!) 166/98 (BP Location: Left arm, Patient Position: Sitting, BP Cuff Size: Adult)   Pulse 97   Temp 36.6 °C (97.8 °F) (Temporal)   Resp 18   Ht 1.6 m (5' 3\")   Wt 111 kg (245 lb)   SpO2 98%   BMI 43.40 kg/m²      Physical Exam  Vitals and nursing note reviewed.   Constitutional:       General: She is not in acute distress.     Appearance: Normal appearance. She is well-developed. She is not diaphoretic.   HENT:      Head: Normocephalic and atraumatic.      Nose: Nose normal.      Mouth/Throat:      Lips: Pink.      Mouth: Mucous membranes are moist.   Eyes:      Extraocular Movements: Extraocular movements intact.      Conjunctiva/sclera: " Conjunctivae normal.      Pupils: Pupils are equal, round, and reactive to light.   Cardiovascular:      Rate and Rhythm: Normal rate and regular rhythm.      Pulses: Normal pulses.      Heart sounds: Normal heart sounds.   Pulmonary:      Effort: Pulmonary effort is normal.   Abdominal:      General: Abdomen is flat.   Musculoskeletal:      Cervical back: Normal range of motion and neck supple.        Legs:    Skin:     General: Skin is warm and dry.      Capillary Refill: Capillary refill takes less than 2 seconds.   Neurological:      General: No focal deficit present.      Mental Status: She is alert and oriented to person, place, and time.   Psychiatric:         Mood and Affect: Mood normal.                   Assessment & Plan              1. Sprain of left ankle, unspecified ligament, initial encounter  DX-ANKLE 3+ VIEWS LEFT      2. Left ankle injury, initial encounter  DX-ANKLE 3+ VIEWS LEFT          RADIOLOGY RESULTS   DX-ANKLE 3+ VIEWS LEFT    Result Date: 4/27/2024 4/27/2024 2:39 PM HISTORY/REASON FOR EXAM:  Pain/Deformity Following Trauma TECHNIQUE/EXAM DESCRIPTION AND NUMBER OF VIEWS:  3 views of the LEFT ankle. COMPARISON: None FINDINGS: There is mild diffuse swelling of the left ankle. There is no displaced fracture or dislocation. The alignment of the ankle is within normal limits.     1.  Mild left ankle swelling. No underlying displaced fracture.         PT HPI and PE are consistent with ankle sprain secondary to twisting ankle during her fall.      Motrin/Advil/Ibuprophen 600 mg every 6 hours as needed for pain or fever.     Laced brace applied to ankle for support.     RICE TREATMENT FOR EXTREMITY INJURIES:  R-rest the extremity as much as possible while pain and swelling persist  I-ice the extremity 15 minutes every 2 hours for the first 24 hours, then 4-5 times daily   C-compress the extremity either with splint or ace wrap as directed  E-elevate the extremity to help with swelling      Gentle  range of motion exercises discussed, demonstrated and encouraged.     Differential diagnosis, supportive care, and indications for immediate follow-up discussed with patient.  Instructed to return to clinic or nearest emergency department for any change in condition, further concerns, or worsening of symptoms.    I personally reviewed prior external notes and test results pertinent to today's visit.  I have independently reviewed and interpreted all diagnostics ordered during this urgent care visit.    PT should follow up with PCP in 1-2 days for re-evaluation if symptoms have not improved.      Discussed red flags and reasons to return to UC or ED.      Pt and/or family verbalized understanding of diagnosis and follow up instructions and was offered informational handout on diagnosis.  PT discharged.     Please note that this dictation was created using voice recognition software. I have made every reasonable attempt to correct obvious errors, but I expect that there may be errors of grammar and possibly content that I did not discover before finalizing the note.

## 2024-04-30 ASSESSMENT — ENCOUNTER SYMPTOMS: INABILITY TO BEAR WEIGHT: 1

## (undated) DEVICE — SUCTION INSTRUMENT YANKAUER BULBOUS TIP W/O VENT (50EA/CA)

## (undated) DEVICE — NEPTUNE 4 PORT MANIFOLD - (20/PK)

## (undated) DEVICE — SUTURE 3-0 CHROMIC GUT SH 27 (36PK/BX)"

## (undated) DEVICE — PACK MINOR BASIN - (2EA/CA)

## (undated) DEVICE — SODIUM CHL IRRIGATION 0.9% 1000ML (12EA/CA)

## (undated) DEVICE — DRAPE SURGICAL U 77X120 - (10/CA)

## (undated) DEVICE — TOWELS CLOTH SURGICAL - (4/PK 20PK/CA)

## (undated) DEVICE — SUTURE GENERAL

## (undated) DEVICE — MASK ANESTHESIA ADULT  - (100/CA)

## (undated) DEVICE — BLADE SURGICAL #15 - (50/BX 3BX/CA)

## (undated) DEVICE — GLOVE BIOGEL INDICATOR SZ 7.5 SURGICAL PF LTX - (50PR/BX 4BX/CA)

## (undated) DEVICE — SET LEADWIRE 5 LEAD BEDSIDE DISPOSABLE ECG (1SET OF 5/EA)

## (undated) DEVICE — SET EXTENSION WITH 2 PORTS (48EA/CA) ***PART #2C8610 IS A SUBSTITUTE*****

## (undated) DEVICE — KIT ANESTHESIA W/CIRCUIT & 3/LT BAG W/FILTER (20EA/CA)

## (undated) DEVICE — TUBING CLEARLINK DUO-VENT - C-FLO (48EA/CA)

## (undated) DEVICE — KIT ROOM DECONTAMINATION

## (undated) DEVICE — HEAD HOLDER JUNIOR/ADULT

## (undated) DEVICE — NEEDLE NON SAFETY 25 GA X 1 1/2 IN HYPO (100EA/BX)

## (undated) DEVICE — GLOVE BIOGEL SZ 7.5 SURGICAL PF LTX - (50PR/BX 4BX/CA)

## (undated) DEVICE — LACTATED RINGERS INJ 1000 ML - (14EA/CA 60CA/PF)

## (undated) DEVICE — CANISTER SUCTION 3000ML MECHANICAL FILTER AUTO SHUTOFF MEDI-VAC NONSTERILE LF DISP  (40EA/CA)

## (undated) DEVICE — SUTURE 2-0 SILK FS (12EA/BX)

## (undated) DEVICE — PROTECTOR ULNA NERVE - (36PR/CA)

## (undated) DEVICE — SENSOR SPO2 NEO LNCS ADHESIVE (20/BX) SEE USER NOTES

## (undated) DEVICE — GLOVE, BIOGEL ECLIPSE, SZ 7.0, PF LTX (50/BX)

## (undated) DEVICE — ELECTRODE 850 FOAM ADHESIVE - HYDROGEL RADIOTRNSPRNT (50/PK)